# Patient Record
Sex: FEMALE | Race: BLACK OR AFRICAN AMERICAN | NOT HISPANIC OR LATINO | Employment: FULL TIME | URBAN - METROPOLITAN AREA
[De-identification: names, ages, dates, MRNs, and addresses within clinical notes are randomized per-mention and may not be internally consistent; named-entity substitution may affect disease eponyms.]

---

## 2023-05-19 ENCOUNTER — HOSPITAL ENCOUNTER (EMERGENCY)
Facility: HOSPITAL | Age: 43
Discharge: HOME | End: 2023-05-19
Attending: INTERNAL MEDICINE
Payer: MEDICAID

## 2023-05-19 VITALS
BODY MASS INDEX: 45.99 KG/M2 | TEMPERATURE: 99 F | SYSTOLIC BLOOD PRESSURE: 150 MMHG | HEIGHT: 67 IN | OXYGEN SATURATION: 99 % | HEART RATE: 77 BPM | DIASTOLIC BLOOD PRESSURE: 92 MMHG | WEIGHT: 293 LBS | RESPIRATION RATE: 18 BRPM

## 2023-05-19 DIAGNOSIS — J22 ACUTE LOWER RESPIRATORY TRACT INFECTION: Primary | ICD-10-CM

## 2023-05-19 DIAGNOSIS — F17.200 SMOKER: ICD-10-CM

## 2023-05-19 PROCEDURE — 63600175 PHARM REV CODE 636 W HCPCS: Performed by: INTERNAL MEDICINE

## 2023-05-19 PROCEDURE — 99284 EMERGENCY DEPT VISIT MOD MDM: CPT | Mod: 25

## 2023-05-19 PROCEDURE — 96372 THER/PROPH/DIAG INJ SC/IM: CPT | Performed by: INTERNAL MEDICINE

## 2023-05-19 RX ORDER — DEXAMETHASONE SODIUM PHOSPHATE 4 MG/ML
8 INJECTION, SOLUTION INTRA-ARTICULAR; INTRALESIONAL; INTRAMUSCULAR; INTRAVENOUS; SOFT TISSUE ONCE
Status: COMPLETED | OUTPATIENT
Start: 2023-05-19 | End: 2023-05-19

## 2023-05-19 RX ORDER — AZITHROMYCIN 250 MG/1
TABLET, FILM COATED ORAL
Qty: 6 TABLET | Refills: 0 | Status: SHIPPED | OUTPATIENT
Start: 2023-05-19 | End: 2023-08-03

## 2023-05-19 RX ORDER — PREDNISONE 20 MG/1
20 TABLET ORAL DAILY
Qty: 5 TABLET | Refills: 0 | Status: SHIPPED | OUTPATIENT
Start: 2023-05-19 | End: 2023-08-03

## 2023-05-19 RX ORDER — AZELASTINE 1 MG/ML
1 SPRAY, METERED NASAL 2 TIMES DAILY
Qty: 30 ML | Refills: 0 | Status: SHIPPED | OUTPATIENT
Start: 2023-05-19 | End: 2023-08-03

## 2023-05-19 RX ADMIN — DEXAMETHASONE SODIUM PHOSPHATE 8 MG: 4 INJECTION, SOLUTION INTRA-ARTICULAR; INTRALESIONAL; INTRAMUSCULAR; INTRAVENOUS; SOFT TISSUE at 03:05

## 2023-05-19 NOTE — ED PROVIDER NOTES
Source of History:  Patient, no limitations    Chief complaint:  Cough (Pt to er with cough x 1 week. Pt states that she was recenty dx with bronchitis. Covid/flu swabbed on the 15)      HPI:  Larisa Marcus is a 42 y.o. female presenting with Cough (Pt to er with cough x 1 week. Pt states that she was recenty dx with bronchitis. Covid/flu swabbed on the 15th)         Cough: Patient complains of dyspnea, productive cough, and voice changes .  Symptoms began 1 week ago.  The cough is with wheezing, with shortness of breath, worsening over time and is aggravated by  smoking and infxn  Associated symptoms include:change in voice, chills, and wheezing. Patient does have a history of smoking. Patient  has previous Chest X-ray.        Review of Systems   Constitutional symptoms:  Negative except as documented in HPI.   Skin symptoms:  Negative except as documented in HPI.   HEENT symptoms:  Negative except as documented in HPI.   Respiratory symptoms:  Negative except as documented in HPI.   Cardiovascular symptoms:  Negative except as documented in HPI.   Gastrointestinal symptoms:  Negative except as documented in HPI.    Genitourinary symptoms:  Negative except as documented in HPI.   Musculoskeletal symptoms:  Negative except as documented in HPI.   Neurologic symptoms:  Negative except as documented in HPI.   Psychiatric symptoms:  Negative except as documented in HPI.   Allergy/immunologic symptoms:  Negative except as documented in HPI.             Additional review of systems information: All other systems reviewed and otherwise negative.      Review of patient's allergies indicates:  No Known Allergies    PMH:  As per HPI and below:    History reviewed. No pertinent past medical history.     History reviewed. No pertinent family history.    Past Surgical History:   Procedure Laterality Date     SECTION      x1       Social History     Tobacco Use    Smoking status: Every Day     Packs/day: 0.50  "    Types: Cigarettes    Smokeless tobacco: Never   Substance Use Topics    Alcohol use: Not Currently       There is no problem list on file for this patient.       Physical Exam:    BP (!) 150/92   Pulse 77   Temp 98.9 °F (37.2 °C) (Oral)   Resp 18   Ht 5' 7" (1.702 m)   Wt (!) 186.4 kg (411 lb)   SpO2 99%   BMI 64.37 kg/m²     Nursing note and vital signs reviewed.    General:  Alert, no acute distress. obese  Skin: Normal for Ethnic Origin, No cyanosis  HEENT: Normocephalic and atraumatic, Vision unchanged, Pupils symmetric, No icterus , Nasal mucosa is pink and moist, mild pharyngeal erythema, +hoarse voice, +nasal congestion  Cardiovascular:  Regular rate and rhythm  Chest Wall: No deformity, equal chest rise  Respiratory:  mild wheeze right side only, respirations are non-labored.    Musculoskeletal:  No deformity, Normal perfusion to all extremities  Gastrointestinal:  Soft, Non distended  Neurological:  Alert and oriented, normal motor observed, normal speech observed.    Psychiatric:  Cooperative, appropriate mood & affect.        Labs that have been ordered have been independently reviewed and interpreted by myself.     Old Chart Reviewed.      Initial Impression/ Differential Dx:  Bronchitis, URI, allergies, irritants, pulmonary edema, GERD, laryngitis, tracheitis, asthma, sinusitis, pneumonia, viral, COPD, medication side effect      MDM:      Reviewed Nurses Note.    Reviewed Pertinent old records.    Orders Placed This Encounter    X-Ray Chest PA And Lateral    dexAMETHasone injection 8 mg    predniSONE (DELTASONE) 20 MG tablet    azithromycin (Z-FAUSTO) 250 MG tablet    azelastine (ASTELIN) 137 mcg (0.1 %) nasal spray                    Labs Reviewed - No data to display       X-Ray Chest PA And Lateral    (Results Pending)        No visits with results within 1 Day(s) from this visit.   Latest known visit with results is:   No results found for any previous visit.       Imaging Results         "      X-Ray Chest PA And Lateral (In process)                                                          Diagnostic Impression:    1. Acute lower respiratory tract infection    2. Smoker         ED Disposition Condition    Discharge Stable             Follow-up Information       [facility] - Emergency Dept.    Specialty: Emergency Medicine  Why: If symptoms worsen  Contact information:  [address]  [facility] [id number] [phone]                            ED Prescriptions       Medication Sig Dispense Start Date End Date Auth. Provider    predniSONE (DELTASONE) 20 MG tablet Take 1 tablet (20 mg total) by mouth once daily. 5 tablet 5/19/2023 -- Chris Lao DO    azithromycin (Z-FAUSTO) 250 MG tablet Take 2 tablets by mouth on day 1; Take 1 tablet by mouth on days 2-5 6 tablet 5/19/2023 -- Chris Lao DO    azelastine (ASTELIN) 137 mcg (0.1 %) nasal spray 1 spray (137 mcg total) by Nasal route 2 (two) times daily. 30 mL 5/19/2023 -- Chris Lao DO          Follow-up Information       Follow up With Specialties Details Why Contact Info    [facility] - Emergency Dept Emergency Medicine  If symptoms worsen [id number]  [facility] [id number] [phone]             Chris Lao DO  05/19/23 0357

## 2023-07-16 ENCOUNTER — HOSPITAL ENCOUNTER (EMERGENCY)
Facility: HOSPITAL | Age: 43
Discharge: HOME | End: 2023-07-16
Attending: EMERGENCY MEDICINE
Payer: MEDICAID

## 2023-07-16 VITALS
OXYGEN SATURATION: 100 % | BODY MASS INDEX: 45.99 KG/M2 | HEIGHT: 67 IN | WEIGHT: 293 LBS | DIASTOLIC BLOOD PRESSURE: 83 MMHG | RESPIRATION RATE: 20 BRPM | SYSTOLIC BLOOD PRESSURE: 122 MMHG | HEART RATE: 88 BPM

## 2023-07-16 DIAGNOSIS — R60.0 BILATERAL LOWER EXTREMITY EDEMA: Primary | ICD-10-CM

## 2023-07-16 LAB
ABS NEUT CALC (OHS): 3.69 X10(3)/MCL (ref 2.1–9.2)
ALBUMIN SERPL-MCNC: 3.4 G/DL (ref 3.5–5)
ALBUMIN/GLOB SERPL: 1 RATIO (ref 1.1–2)
ALP SERPL-CCNC: 88 UNIT/L (ref 40–150)
ALT SERPL-CCNC: 9 UNIT/L (ref 0–55)
ANISOCYTOSIS BLD QL SMEAR: ABNORMAL
AST SERPL-CCNC: 12 UNIT/L (ref 5–34)
BILIRUBIN DIRECT+TOT PNL SERPL-MCNC: 0.2 MG/DL
BNP BLD-MCNC: <10 PG/ML
BUN SERPL-MCNC: 11.6 MG/DL (ref 7–18.7)
CALCIUM SERPL-MCNC: 9.2 MG/DL (ref 8.4–10.2)
CHLORIDE SERPL-SCNC: 105 MMOL/L (ref 98–107)
CO2 SERPL-SCNC: 23 MMOL/L (ref 22–29)
CREAT SERPL-MCNC: 0.87 MG/DL (ref 0.55–1.02)
EOSINOPHIL NFR BLD MANUAL: 0.18 X10(3)/MCL (ref 0–0.9)
EOSINOPHIL NFR BLD MANUAL: 2 % (ref 0–8)
ERYTHROCYTE [DISTWIDTH] IN BLOOD BY AUTOMATED COUNT: 15.9 % (ref 11.5–17)
GFR SERPLBLD CREATININE-BSD FMLA CKD-EPI: >60 MLS/MIN/1.73/M2
GLOBULIN SER-MCNC: 3.3 GM/DL (ref 2.4–3.5)
GLUCOSE SERPL-MCNC: 100 MG/DL (ref 74–100)
HCT VFR BLD AUTO: 42.3 % (ref 37–47)
HGB BLD-MCNC: 13.3 G/DL (ref 12–16)
LYMPHOCYTES NFR BLD MANUAL: 4.41 X10(3)/MCL
LYMPHOCYTES NFR BLD MANUAL: 49 % (ref 13–40)
MCH RBC QN AUTO: 26.1 PG (ref 27–31)
MCHC RBC AUTO-ENTMCNC: 31.4 G/DL (ref 33–36)
MCV RBC AUTO: 82.9 FL (ref 80–94)
MONOCYTES NFR BLD MANUAL: 0.72 X10(3)/MCL (ref 0.1–1.3)
MONOCYTES NFR BLD MANUAL: 8 % (ref 2–11)
NEUTROPHILS NFR BLD MANUAL: 41 % (ref 47–80)
PLATELET # BLD AUTO: 319 X10(3)/MCL (ref 130–400)
PLATELET # BLD EST: ADEQUATE 10*3/UL
PMV BLD AUTO: 10.4 FL (ref 7.4–10.4)
POTASSIUM SERPL-SCNC: 3.8 MMOL/L (ref 3.5–5.1)
PROT SERPL-MCNC: 6.7 GM/DL (ref 6.4–8.3)
RBC # BLD AUTO: 5.1 X10(6)/MCL (ref 4.2–5.4)
RBC MORPH BLD: ABNORMAL
SODIUM SERPL-SCNC: 140 MMOL/L (ref 136–145)
TROPONIN I SERPL-MCNC: <0.01 NG/ML (ref 0–0.04)
TSH SERPL-ACNC: 2.06 UIU/ML (ref 0.35–4.94)
WBC # SPEC AUTO: 9 X10(3)/MCL (ref 4.5–11.5)

## 2023-07-16 PROCEDURE — 84443 ASSAY THYROID STIM HORMONE: CPT | Performed by: EMERGENCY MEDICINE

## 2023-07-16 PROCEDURE — 83880 ASSAY OF NATRIURETIC PEPTIDE: CPT | Performed by: EMERGENCY MEDICINE

## 2023-07-16 PROCEDURE — 84484 ASSAY OF TROPONIN QUANT: CPT | Performed by: EMERGENCY MEDICINE

## 2023-07-16 PROCEDURE — 85027 COMPLETE CBC AUTOMATED: CPT | Performed by: EMERGENCY MEDICINE

## 2023-07-16 PROCEDURE — 99283 EMERGENCY DEPT VISIT LOW MDM: CPT

## 2023-07-16 PROCEDURE — 80053 COMPREHEN METABOLIC PANEL: CPT | Performed by: EMERGENCY MEDICINE

## 2023-07-16 NOTE — Clinical Note
"Larisa Romichaelle Marcus was seen and treated in our emergency department on 7/16/2023.  She may return to work on 07/18/2023.       If you have any questions or concerns, please don't hesitate to call.      Skylar Frederick MD"

## 2023-07-16 NOTE — DISCHARGE INSTRUCTIONS
Elevate your legs, wear compression hose, low-salt diet, weight loss.  Follow-up requested with a primary care provider [facility].  The Lasix you were previously prescribed can help with edema but also can cause dehydration so use caution.

## 2023-07-16 NOTE — ED PROVIDER NOTES
Encounter Date: 2023       History     Chief Complaint   Patient presents with    Foot Pain    Foot Swelling     Pt to er with b/l foot pain/swelling. Pt denies injury/trauma      42-year-old female with a history of morbid obesity complains of swelling to both of her feet for the last 4-6 weeks.  She states she went to an emergency room on  in [place] and was prescribed a fluid pill but that is not helping.  She does work at [employer] so is on her feet a lot.  Her feet tend to swell during the day and resolves overnight.  She has no chest pain or shortness of breath.  She has no PCP and can not remember when she last had any blood work done.  She currently is not pregnant stating that she is had a bilateral tubal ligation.      Review of patient's allergies indicates:  No Known Allergies  History reviewed. No pertinent past medical history.  Past Surgical History:   Procedure Laterality Date     SECTION      x1     History reviewed. No pertinent family history.  Social History     Tobacco Use    Smoking status: Every Day     Packs/day: 0.50     Types: Cigarettes    Smokeless tobacco: Never   Substance Use Topics    Alcohol use: Not Currently     Review of Systems   Musculoskeletal:         Bilateral lower extremity swelling   All other systems reviewed and are negative.    Physical Exam     Initial Vitals [23 0059]   BP Pulse Resp Temp SpO2   122/83 88 20 -- 100 %      MAP       --         Physical Exam    Nursing note and vitals reviewed.  Constitutional: She appears well-developed and well-nourished. She is not diaphoretic. No distress.   HENT:   Head: Normocephalic and atraumatic.   Mouth/Throat: Oropharynx is clear and moist.   Eyes: Conjunctivae are normal. Pupils are equal, round, and reactive to light.   Neck: Neck supple. No JVD present.   Cardiovascular:  Normal rate, regular rhythm, normal heart sounds and intact distal pulses.           Pulmonary/Chest: Breath sounds  normal. No respiratory distress. She has no wheezes. She has no rhonchi. She has no rales.   Abdominal: Abdomen is soft. She exhibits no distension. There is no abdominal tenderness. There is no guarding.   Musculoskeletal:         General: Edema present. No tenderness. Normal range of motion.      Cervical back: Neck supple.      Comments: Pedal edema bilaterally and trace pitting pretibial edema.  Pedal pulses are 2+ with no sign of infection.  No calf tenderness and negative Homans sign.     Neurological: She is alert and oriented to person, place, and time.   Skin: Skin is warm and dry. Capillary refill takes less than 2 seconds. No rash noted.   Psychiatric: She has a normal mood and affect. Thought content normal.       ED Course   Procedures  Labs Reviewed   COMPREHENSIVE METABOLIC PANEL - Abnormal; Notable for the following components:       Result Value    Albumin Level 3.4 (*)     Albumin/Globulin Ratio 1.0 (*)     All other components within normal limits   CBC WITH DIFFERENTIAL - Abnormal; Notable for the following components:    MCH 26.1 (*)     MCHC 31.4 (*)     All other components within normal limits   MANUAL DIFFERENTIAL - Abnormal; Notable for the following components:    Neutrophils % 41 (*)     Lymphs % 49 (*)     RBC Morph Abnormal (*)     Anisocytosis 1+ (*)     All other components within normal limits   TROPONIN I - Normal   B-TYPE NATRIURETIC PEPTIDE - Normal   TSH - Normal   CBC W/ AUTO DIFFERENTIAL    Narrative:     The following orders were created for panel order CBC auto differential.  Procedure                               Abnormality         Status                     ---------                               -----------         ------                     CBC with Differential[[id number]]        Abnormal            Final result               Manual Differential[[id number]]          Abnormal            Final result                 Please view results for these tests on the individual  orders.          Imaging Results    None          Medications - No data to display  Medical Decision Making:   Initial Assessment:   42-year-old female with a history of morbid obesity complains of swelling to both of her feet for the last 4-6 weeks.  She states she went to an emergency room on July 4th in [place] and was prescribed a fluid pill but that is not helping.  She does work at [employer] so is on her feet a lot.  Her feet tend to swell during the day and resolves overnight.  She has no chest pain or shortness of breath.  She has no PCP and can not remember when she last had any blood work done.  She currently is not pregnant stating that she is had a bilateral tubal ligation.      Differential Diagnosis:   Differential diagnosis includes but is not limited to dependent edema, edema related to hormones status, edema related to salt intake, congestive heart failure, kidney failure, liver failure  Clinical Tests:   Lab Tests: Reviewed  ED Management:  Patient was seen and evaluated in the emergency department with history, physical exam, lab work.  She has bilateral pedal edema which I suspect is due to a combination of factors.  She stands on her feet all day and has morbid obesity.  No sign of congestive heart failure in her lab work is relatively benign.  I recommend she follow-up with a primary care provider, elevate her feet when she is not working, and eat a low-salt diet.           ED Course as of 07/17/23 0234   Sun Jul 16, 2023   0240 Thyroid Stimulating Hormone: 2.063 [SH]   0240 Troponin I: <0.010 [SH]   0241 BNP: <10.0 [SH]      ED Course User Index  [SH] Skylar Frederick MD                 Clinical Impression:   Final diagnoses:  [R60.0] Bilateral lower extremity edema (Primary)        ED Disposition Condition    Discharge Stable          ED Prescriptions    None       Follow-up Information       Follow up With Specialties Details Why Contact Info    PCP  Schedule an appointment as  soon as possible for a visit                Skylar Frederick MD  07/17/23 0236

## 2023-08-03 ENCOUNTER — OFFICE VISIT (OUTPATIENT)
Dept: INTERNAL MEDICINE | Facility: CLINIC | Age: 43
End: 2023-08-03
Payer: MEDICAID

## 2023-08-03 VITALS
RESPIRATION RATE: 18 BRPM | OXYGEN SATURATION: 97 % | SYSTOLIC BLOOD PRESSURE: 138 MMHG | HEIGHT: 67 IN | TEMPERATURE: 98 F | DIASTOLIC BLOOD PRESSURE: 80 MMHG | WEIGHT: 293 LBS | HEART RATE: 60 BPM | BODY MASS INDEX: 45.99 KG/M2

## 2023-08-03 DIAGNOSIS — R60.0 BILATERAL LOWER EXTREMITY EDEMA: ICD-10-CM

## 2023-08-03 PROCEDURE — 99214 OFFICE O/P EST MOD 30 MIN: CPT | Mod: PBBFAC | Performed by: STUDENT IN AN ORGANIZED HEALTH CARE EDUCATION/TRAINING PROGRAM

## 2023-08-03 RX ORDER — FUROSEMIDE 20 MG/1
20 TABLET ORAL DAILY PRN
Qty: 30 TABLET | Refills: 0 | Status: SHIPPED | OUTPATIENT
Start: 2023-08-03 | End: 2023-09-28 | Stop reason: SDUPTHER

## 2023-08-03 NOTE — PROGRESS NOTES
Subjective     Patient ID: Larisa Marcus is a 42 y.o. female.    Chief Complaint: No chief complaint on file.    Patient is a 42-year-old female with no significant past medical history who presents to [facility] internal medicine clinic to establish care.  Patient recently seen in emergency department for increased swelling of bilateral lower extremities.  Workup at that time negative for any acute issue, patient was discharged on oral Lasix.  Chest x-ray done at that time showed possible cardiomegaly.  BNP and troponin remained negative, most other labs within normal limits.  Patient states swelling has increased for the last few weeks, she notes it is worse at the end of the day and better in the morning after her legs have been elevated.  She works at [employer] in his on her feet most of the day, can usually tolerate this without issue.  Patient does state she gets some shortness of breath with increased exertion, no shortness of breath while sitting still and no episodes of chest pain.  Patient states she does not take any medicines on a daily basis.  She states she has never been diagnosed with diabetes, no hypertension, no history of high cholesterol.  Does not struggle with excessive daytime sleepiness but does state she snores loudly and has had previous witnessed apnea.  No prior sleep study has been done.  Patient remains somewhat anxious about new issues, otherwise has no acute concerns at this time      Review of Systems   Constitutional:  Negative for activity change and unexpected weight change.   HENT:  Negative for hearing loss, rhinorrhea and trouble swallowing.    Eyes:  Negative for discharge and visual disturbance.   Respiratory:  Negative for chest tightness and wheezing.    Cardiovascular:  Positive for leg swelling. Negative for chest pain and palpitations.   Gastrointestinal:  Negative for blood in stool, constipation, diarrhea and vomiting.   Endocrine: Negative for polydipsia and polyuria.    Genitourinary:  Negative for difficulty urinating, dysuria, hematuria and menstrual problem.   Musculoskeletal:  Negative for arthralgias, joint swelling and neck pain.   Neurological:  Negative for weakness and headaches.   Psychiatric/Behavioral:  Negative for confusion and dysphoric mood.         Objective     Physical Exam  Constitutional:       General: She is not in acute distress.     Appearance: Normal appearance. She is obese.   HENT:      Head: Normocephalic and atraumatic.   Cardiovascular:      Rate and Rhythm: Regular rhythm. Bradycardia present.      Heart sounds: Normal heart sounds.   Pulmonary:      Effort: Pulmonary effort is normal. No respiratory distress.      Breath sounds: Normal breath sounds.   Abdominal:      Palpations: Abdomen is soft.      Tenderness: There is no abdominal tenderness.   Musculoskeletal:         General: Normal range of motion.      Right lower leg: No edema.      Left lower leg: No edema.   Neurological:      General: No focal deficit present.      Mental Status: She is alert and oriented to person, place, and time.          Assessment and Plan     1. Bilateral lower extremity edema  -     Ambulatory referral/consult to Internal Medicine  -     Echo Saline Bubble? No; Future; Expected date: 08/17/2023    Other orders  -     furosemide (LASIX) 20 MG tablet; Take 1 tablet (20 mg total) by mouth daily as needed (swelling).  Dispense: 30 tablet; Refill: 0    Bilateral lower extremity edema   Dyspnea on exertion   -will order echocardiogram to be completed in next 1-2 weeks   -will follow-up on results with patient   -will prescribe Lasix 20 mg as needed for more significant swelling   -otherwise counseled on using compression stockings, elevating legs when able   -likely will eventually need sleep study performed, patient states she would like to wait on this for now    Will see patient in 2 months after echocardiogram complete   Additional labs and health maintenance at  that time    [facility]       Follow up in about 3 months (around 11/3/2023).

## 2023-08-07 ENCOUNTER — HOSPITAL ENCOUNTER (EMERGENCY)
Facility: HOSPITAL | Age: 43
Discharge: HOME | End: 2023-08-07
Attending: EMERGENCY MEDICINE
Payer: MEDICAID

## 2023-08-07 VITALS
HEART RATE: 76 BPM | TEMPERATURE: 97 F | OXYGEN SATURATION: 97 % | SYSTOLIC BLOOD PRESSURE: 136 MMHG | RESPIRATION RATE: 18 BRPM | DIASTOLIC BLOOD PRESSURE: 83 MMHG

## 2023-08-07 DIAGNOSIS — H65.90 FLUID LEVEL BEHIND TYMPANIC MEMBRANE, UNSPECIFIED LATERALITY: Primary | ICD-10-CM

## 2023-08-07 DIAGNOSIS — R42 DIZZINESS: ICD-10-CM

## 2023-08-07 DIAGNOSIS — R06.00 DYSPNEA: ICD-10-CM

## 2023-08-07 LAB
ALBUMIN SERPL-MCNC: 3.6 G/DL (ref 3.5–5)
ALBUMIN/GLOB SERPL: 1 RATIO (ref 1.1–2)
ALP SERPL-CCNC: 91 UNIT/L (ref 40–150)
ALT SERPL-CCNC: 12 UNIT/L (ref 0–55)
APPEARANCE UR: CLEAR
AST SERPL-CCNC: 15 UNIT/L (ref 5–34)
B-HCG SERPL QL: NEGATIVE
BASOPHILS # BLD AUTO: 0.05 X10(3)/MCL
BASOPHILS NFR BLD AUTO: 0.6 %
BILIRUB UR QL STRIP.AUTO: NEGATIVE
BILIRUBIN DIRECT+TOT PNL SERPL-MCNC: 0.3 MG/DL
BNP BLD-MCNC: <10 PG/ML
BUN SERPL-MCNC: 8.7 MG/DL (ref 7–18.7)
CALCIUM SERPL-MCNC: 9.6 MG/DL (ref 8.4–10.2)
CHLORIDE SERPL-SCNC: 104 MMOL/L (ref 98–107)
CO2 SERPL-SCNC: 27 MMOL/L (ref 22–29)
COLOR UR: NORMAL
CREAT SERPL-MCNC: 0.78 MG/DL (ref 0.55–1.02)
D DIMER PPP IA.FEU-MCNC: 0.44 UG/ML FEU (ref 0–0.5)
EOSINOPHIL # BLD AUTO: 0.49 X10(3)/MCL (ref 0–0.9)
EOSINOPHIL NFR BLD AUTO: 6.1 %
ERYTHROCYTE [DISTWIDTH] IN BLOOD BY AUTOMATED COUNT: 15.7 % (ref 11.5–17)
GFR SERPLBLD CREATININE-BSD FMLA CKD-EPI: >60 MLS/MIN/1.73/M2
GLOBULIN SER-MCNC: 3.7 GM/DL (ref 2.4–3.5)
GLUCOSE SERPL-MCNC: 107 MG/DL (ref 74–100)
GLUCOSE UR QL STRIP.AUTO: NEGATIVE
HCT VFR BLD AUTO: 42.6 % (ref 37–47)
HGB BLD-MCNC: 13.6 G/DL (ref 12–16)
IMM GRANULOCYTES # BLD AUTO: 0.02 X10(3)/MCL (ref 0–0.04)
IMM GRANULOCYTES NFR BLD AUTO: 0.2 %
KETONES UR QL STRIP.AUTO: NEGATIVE
LEUKOCYTE ESTERASE UR QL STRIP.AUTO: NEGATIVE
LYMPHOCYTES # BLD AUTO: 3.86 X10(3)/MCL (ref 0.6–4.6)
LYMPHOCYTES NFR BLD AUTO: 47.8 %
MCH RBC QN AUTO: 26.6 PG (ref 27–31)
MCHC RBC AUTO-ENTMCNC: 31.9 G/DL (ref 33–36)
MCV RBC AUTO: 83.4 FL (ref 80–94)
MONOCYTES # BLD AUTO: 0.53 X10(3)/MCL (ref 0.1–1.3)
MONOCYTES NFR BLD AUTO: 6.6 %
NEUTROPHILS # BLD AUTO: 3.12 X10(3)/MCL (ref 2.1–9.2)
NEUTROPHILS NFR BLD AUTO: 38.7 %
NITRITE UR QL STRIP.AUTO: NEGATIVE
NRBC BLD AUTO-RTO: 0 %
PH UR STRIP.AUTO: 6.5 [PH]
PLATELET # BLD AUTO: 333 X10(3)/MCL (ref 130–400)
PMV BLD AUTO: 10.1 FL (ref 7.4–10.4)
POTASSIUM SERPL-SCNC: 3.8 MMOL/L (ref 3.5–5.1)
PROT SERPL-MCNC: 7.3 GM/DL (ref 6.4–8.3)
PROT UR QL STRIP.AUTO: NEGATIVE
RBC # BLD AUTO: 5.11 X10(6)/MCL (ref 4.2–5.4)
RBC UR QL AUTO: NEGATIVE
SODIUM SERPL-SCNC: 140 MMOL/L (ref 136–145)
SP GR UR STRIP.AUTO: 1.02
TROPONIN I SERPL-MCNC: <0.01 NG/ML (ref 0–0.04)
UROBILINOGEN UR STRIP-ACNC: 0.2
WBC # SPEC AUTO: 8.07 X10(3)/MCL (ref 4.5–11.5)

## 2023-08-07 PROCEDURE — 93005 ELECTROCARDIOGRAM TRACING: CPT

## 2023-08-07 PROCEDURE — 25000003 PHARM REV CODE 250: Performed by: EMERGENCY MEDICINE

## 2023-08-07 PROCEDURE — 81003 URINALYSIS AUTO W/O SCOPE: CPT | Performed by: EMERGENCY MEDICINE

## 2023-08-07 PROCEDURE — 81025 URINE PREGNANCY TEST: CPT | Performed by: EMERGENCY MEDICINE

## 2023-08-07 PROCEDURE — 80053 COMPREHEN METABOLIC PANEL: CPT | Performed by: EMERGENCY MEDICINE

## 2023-08-07 PROCEDURE — 83880 ASSAY OF NATRIURETIC PEPTIDE: CPT | Performed by: EMERGENCY MEDICINE

## 2023-08-07 PROCEDURE — 99285 EMERGENCY DEPT VISIT HI MDM: CPT | Mod: 25

## 2023-08-07 PROCEDURE — 85379 FIBRIN DEGRADATION QUANT: CPT | Performed by: EMERGENCY MEDICINE

## 2023-08-07 PROCEDURE — 84484 ASSAY OF TROPONIN QUANT: CPT | Performed by: EMERGENCY MEDICINE

## 2023-08-07 PROCEDURE — 85025 COMPLETE CBC W/AUTO DIFF WBC: CPT | Performed by: EMERGENCY MEDICINE

## 2023-08-07 RX ORDER — MECLIZINE HYDROCHLORIDE 25 MG/1
50 TABLET ORAL
Status: COMPLETED | OUTPATIENT
Start: 2023-08-07 | End: 2023-08-07

## 2023-08-07 RX ORDER — MECLIZINE HYDROCHLORIDE 25 MG/1
25 TABLET ORAL 3 TIMES DAILY PRN
Qty: 20 TABLET | Refills: 0 | OUTPATIENT
Start: 2023-08-07 | End: 2023-09-10

## 2023-08-07 RX ADMIN — MECLIZINE HYDROCHLORIDE 50 MG: 25 TABLET ORAL at 12:08

## 2023-08-07 NOTE — ED TRIAGE NOTES
Pt to er c/o dizziness onset one week ago with sob onset this morning. Pt c/o feeling light headed and is very anxious.

## 2023-08-07 NOTE — ED PROVIDER NOTES
Encounter Date: 2023       History     Chief Complaint   Patient presents with    Dizziness     Pt to er c/o dizziness onset one week ago with sob onset this morning. Pt c/o feeling light headed and is very anxious.     Patient is a42 yo F presneting with an episode of dizziness. Started when alking today. Worse with moving head, describes as room spinning. Still having it when she moves her head. Never had this happen befofre. No chest pain associated with it. No recent illness. No  ear complaints.         Review of patient's allergies indicates:  No Known Allergies  No past medical history on file.  Past Surgical History:   Procedure Laterality Date     SECTION      x1     Family History   Problem Relation Age of Onset    Diabetes Mother     No Known Problems Father     Diabetes Sister     Diabetes Brother      Social History     Tobacco Use    Smoking status: Every Day     Current packs/day: 0.00     Types: Cigarettes     Start date:     Smokeless tobacco: Never   Substance Use Topics    Alcohol use: Not Currently    Drug use: Never     Review of Systems    Physical Exam     Initial Vitals [23 1015]   BP Pulse Resp Temp SpO2   126/78 79 (!) 26 96.8 °F (36 °C) 98 %      MAP       --         Physical Exam    Nursing note and vitals reviewed.  Constitutional: She appears well-developed and well-nourished. No distress.   HENT:   Head: Normocephalic and atraumatic.   Fluid in the R middl eear   Eyes: Conjunctivae are normal. Pupils are equal, round, and reactive to light.   Neck: Neck supple.   Cardiovascular:  Normal rate, regular rhythm and normal heart sounds.           No murmur heard.  Pulmonary/Chest: Breath sounds normal. No respiratory distress. She has no wheezes. She has no rhonchi.   Abdominal: Abdomen is soft. Bowel sounds are normal. She exhibits no distension. There is no abdominal tenderness. There is no rebound and no guarding.   Musculoskeletal:         General: No  edema. Normal range of motion.      Cervical back: Neck supple.     Neurological: She is alert and oriented to person, place, and time.   Skin: Skin is warm and dry.   Psychiatric: She has a normal mood and affect. Thought content normal.         ED Course   Procedures  Labs Reviewed - No data to display  EKG Readings: (Independently Interpreted)   EKG Interpretation 10:15 AM    Rate: 76  Rhythm: NSR  QRS: WNL  Qtc: WNL  Sinus arrhythmia, non specific T wave abnormality  No olds for comparision         Imaging Results    None          Medications - No data to display              ED Course as of 08/07/23 1336   Mon Aug 07, 2023   1335 Patient feel  better. Not as dizzy. Able to ambulate without difficulty [GM]      ED Course User Index  [GM] Rebekah Moeller MD                 Clinical Impression:   Final diagnoses:  [R42] Dizziness  [R06.00] Dyspnea               Rebekah Moeller MD  08/07/23 1337

## 2023-08-09 NOTE — PROGRESS NOTES
I have reviewed and concur with the resident's history, physical, assessment, and plan.  I have discussed with him all issues related to the diagnosis, workup and treatment plan. Care provided as reasonable and necessary.    Zhang Hernandez MD  [facility]

## 2023-08-24 ENCOUNTER — HOSPITAL ENCOUNTER (OUTPATIENT)
Dept: CARDIOLOGY | Facility: HOSPITAL | Age: 43
Discharge: HOME | End: 2023-08-24
Attending: STUDENT IN AN ORGANIZED HEALTH CARE EDUCATION/TRAINING PROGRAM
Payer: MEDICAID

## 2023-08-24 DIAGNOSIS — R60.0 BILATERAL LOWER EXTREMITY EDEMA: ICD-10-CM

## 2023-08-24 LAB
AV INDEX (PROSTH): 0.62
AV MEAN GRADIENT: 5 MMHG
AV PEAK GRADIENT: 9 MMHG
AV VALVE AREA BY VELOCITY RATIO: 1.93 CM²
AV VALVE AREA: 2 CM²
AV VELOCITY RATIO: 0.6
CV ECHO LV RWT: 0.35 CM
DOP CALC AO PEAK VEL: 1.46 M/S
DOP CALC AO VTI: 33 CM
DOP CALC LVOT AREA: 3.2 CM2
DOP CALC LVOT DIAMETER: 2.02 CM
DOP CALC LVOT PEAK VEL: 0.88 M/S
DOP CALC LVOT STROKE VOLUME: 65.98 CM3
DOP CALC MV VTI: 34 CM
DOP CALCLVOT PEAK VEL VTI: 20.6 CM
E WAVE DECELERATION TIME: 245.39 MSEC
E/A RATIO: 1.25
ECHO LV POSTERIOR WALL: 0.98 CM (ref 0.6–1.1)
FRACTIONAL SHORTENING: 26 % (ref 28–44)
HR MV ECHO: 60 BPM
INTERVENTRICULAR SEPTUM: 1.02 CM (ref 0.6–1.1)
IVC DIAMETER: 2 CM
LEFT ATRIUM SIZE: 3.86 CM
LEFT ATRIUM VOLUME MOD: 31.12 CM3
LEFT INTERNAL DIMENSION IN SYSTOLE: 4.11 CM (ref 2.1–4)
LEFT VENTRICLE DIASTOLIC VOLUME: 152.97 ML
LEFT VENTRICLE SYSTOLIC VOLUME: 74.67 ML
LEFT VENTRICULAR INTERNAL DIMENSION IN DIASTOLE: 5.59 CM (ref 3.5–6)
LEFT VENTRICULAR MASS: 219.06 G
LV LATERAL E/E' RATIO: 5 M/S
LVOT MG: 1.61 MMHG
LVOT MV: 0.59 CM/S
MV MEAN GRADIENT: 1 MMHG
MV PEAK A VEL: 0.64 M/S
MV PEAK E VEL: 0.8 M/S
MV PEAK GRADIENT: 4 MMHG
MV VALVE AREA BY CONTINUITY EQUATION: 1.94 CM2
OHS LV EJECTION FRACTION SIMPSONS BIPLANE MOD: 49 %
RA MAJOR: 4.55 CM
TDI LATERAL: 0.16 M/S
TRICUSPID ANNULAR PLANE SYSTOLIC EXCURSION: 2.38 CM

## 2023-08-24 PROCEDURE — C8929 TTE W OR WO FOL WCON,DOPPLER: HCPCS

## 2023-08-24 PROCEDURE — 25500020 PHARM REV CODE 255: Performed by: INTERNAL MEDICINE

## 2023-08-24 RX ADMIN — HUMAN ALBUMIN MICROSPHERES AND PERFLUTREN 0.66 MG: 10; .22 INJECTION, SOLUTION INTRAVENOUS at 10:08

## 2023-09-10 ENCOUNTER — HOSPITAL ENCOUNTER (EMERGENCY)
Facility: HOSPITAL | Age: 43
Discharge: HOME | End: 2023-09-10
Attending: EMERGENCY MEDICINE
Payer: MEDICAID

## 2023-09-10 VITALS
HEART RATE: 92 BPM | RESPIRATION RATE: 18 BRPM | OXYGEN SATURATION: 97 % | TEMPERATURE: 98 F | HEIGHT: 67 IN | BODY MASS INDEX: 45.99 KG/M2 | DIASTOLIC BLOOD PRESSURE: 80 MMHG | WEIGHT: 293 LBS | SYSTOLIC BLOOD PRESSURE: 121 MMHG

## 2023-09-10 DIAGNOSIS — R42 VERTIGO: Primary | ICD-10-CM

## 2023-09-10 PROCEDURE — 99283 EMERGENCY DEPT VISIT LOW MDM: CPT

## 2023-09-10 PROCEDURE — 25000003 PHARM REV CODE 250: Performed by: EMERGENCY MEDICINE

## 2023-09-10 RX ORDER — MECLIZINE HYDROCHLORIDE 25 MG/1
25 TABLET ORAL
Status: COMPLETED | OUTPATIENT
Start: 2023-09-10 | End: 2023-09-10

## 2023-09-10 RX ORDER — MECLIZINE HYDROCHLORIDE 25 MG/1
25 TABLET ORAL 3 TIMES DAILY PRN
Qty: 30 TABLET | Refills: 0 | Status: SHIPPED | OUTPATIENT
Start: 2023-09-10 | End: 2023-09-28 | Stop reason: SDUPTHER

## 2023-09-10 RX ADMIN — MECLIZINE HYDROCHLORIDE 25 MG: 25 TABLET ORAL at 04:09

## 2023-09-10 NOTE — Clinical Note
"Larisa Romichaelle Marcus was seen and treated in our emergency department on 9/10/2023.  She may return to work on 09/11/2023.       If you have any questions or concerns, please don't hesitate to call.      Skylar Frederick MD"

## 2023-09-10 NOTE — ED PROVIDER NOTES
"Encounter Date: 9/10/2023       History     Chief Complaint   Patient presents with    Dizziness    Panic Attack     Pt to er with dizziness/panic attacks. Pt states that she was recently dx with vertigo      43-year-old female complains of feeling dizzy which then triggered a panic attack.  She states that she was at work tonight at [employer] when she started feeling a little lightheaded and like the room was spinning but then but the dizziness went away and it felt like it triggered an anxiety attack.  She felt like her heart was racing and she just "could not handle the situation" and "could not maintain" so she came to the emergency room.  She was seen here last month for the same symptoms and prescribed meclizine which she states she is out of.  She says the meclizine works really well.  She is not yet followed up with her PCP for these issues.  She has no ear pain, sore throat, nausea, vomiting.  She has no weakness or numbness and no difficulty swallowing.  She has no speech or visual difficulty.        Review of patient's allergies indicates:  No Known Allergies  Past Medical History:   Diagnosis Date    Benign paroxysmal vertigo, bilateral      Past Surgical History:   Procedure Laterality Date     SECTION      x1     Family History   Problem Relation Age of Onset    Diabetes Mother     No Known Problems Father     Diabetes Sister     Diabetes Brother      Social History     Tobacco Use    Smoking status: Every Day     Types: Cigarettes     Start date:     Smokeless tobacco: Never   Substance Use Topics    Alcohol use: Not Currently    Drug use: Never     Review of Systems   Neurological:  Positive for dizziness.   Psychiatric/Behavioral:  The patient is nervous/anxious.    All other systems reviewed and are negative.      Physical Exam     Initial Vitals [09/10/23 0343]   BP Pulse Resp Temp SpO2   121/80 92 18 97.8 °F (36.6 °C) 97 %      MAP       --         Physical Exam    Nursing note and " "vitals reviewed.  Constitutional: She appears well-developed and well-nourished. She is not diaphoretic. No distress.   HENT:   Head: Normocephalic and atraumatic.   Mouth/Throat: Oropharynx is clear and moist.   Eyes: Conjunctivae are normal. Pupils are equal, round, and reactive to light.   Neck: Neck supple.   Cardiovascular:  Normal rate, regular rhythm, normal heart sounds and intact distal pulses.           Pulmonary/Chest: Breath sounds normal. No respiratory distress. She has no wheezes. She has no rhonchi. She has no rales.   Abdominal: Abdomen is soft. She exhibits no distension. There is no abdominal tenderness. There is no guarding.   Musculoskeletal:         General: No tenderness or edema. Normal range of motion.      Cervical back: Neck supple.     Neurological: She is alert and oriented to person, place, and time.   Skin: Skin is warm and dry. Capillary refill takes less than 2 seconds. No rash noted.   Psychiatric: She has a normal mood and affect. Thought content normal.         ED Course   Procedures  Labs Reviewed - No data to display  EKG Readings: (Independently Interpreted)   Initial Reading: No STEMI. Rhythm: Normal Sinus Rhythm. Heart Rate: 86. Ectopy: No Ectopy. ST Segments: Normal ST Segments. T Waves: Normal. Clinical Impression: Normal Sinus Rhythm Other Impression: Nonspecific changes similar to previous EKGs       Imaging Results    None          Medications   meclizine tablet 25 mg (25 mg Oral Given 9/10/23 5219)     Medical Decision Making  43-year-old female complains of feeling dizzy which then triggered a panic attack.  She states that she was at work tonight at [employer] when she started feeling a little lightheaded and like the room was spinning but then but the dizziness went away and it felt like it triggered an anxiety attack.  She felt like her heart was racing and she just "could not handle the situation" and "could not maintain" so she came to the emergency room.  She " was seen here last month for the same symptoms and prescribed meclizine which she states she is out of.  She says the meclizine works really well.  She is not yet followed up with her PCP for these issues.  She has no ear pain, sore throat, nausea, vomiting.  She has no weakness or numbness and no difficulty swallowing.  She has no speech or visual difficulty.          Risk  Prescription drug management.  Risk Details: Patient was seen and evaluated in the Emergency Department with history, physical exam, EKG.  She was given a dose of meclizine in the emergency room and a prescription for meclizine because she states this works really well for her.  There is no sign of stroke or other serious pathology.  She will follow-up with her primary care provider for further care.  ER return precautions were discussed.                               Clinical Impression:   Final diagnoses:  [R42] Vertigo (Primary)        ED Disposition Condition    Discharge Stable          ED Prescriptions       Medication Sig Dispense Start Date End Date Auth. Provider    meclizine (ANTIVERT) 25 mg tablet Take 1 tablet (25 mg total) by mouth 3 (three) times daily as needed for Dizziness. 30 tablet 9/10/2023 -- Skylar Frederick MD          Follow-up Information       Follow up With Specialties Details Why Contact Info    PCP  Schedule an appointment as soon as possible for a visit                Skylar Frederick MD  09/10/23 5085

## 2023-09-28 ENCOUNTER — OFFICE VISIT (OUTPATIENT)
Dept: INTERNAL MEDICINE | Facility: CLINIC | Age: 43
End: 2023-09-28
Payer: MEDICAID

## 2023-09-28 VITALS
TEMPERATURE: 98 F | DIASTOLIC BLOOD PRESSURE: 69 MMHG | SYSTOLIC BLOOD PRESSURE: 109 MMHG | OXYGEN SATURATION: 98 % | WEIGHT: 293 LBS | BODY MASS INDEX: 45.99 KG/M2 | HEIGHT: 67 IN | HEART RATE: 79 BPM | RESPIRATION RATE: 16 BRPM

## 2023-09-28 DIAGNOSIS — M79.89 LOCALIZED SWELLING OF BOTH LOWER EXTREMITIES: ICD-10-CM

## 2023-09-28 DIAGNOSIS — H81.10 BENIGN PAROXYSMAL POSITIONAL VERTIGO, UNSPECIFIED LATERALITY: Primary | ICD-10-CM

## 2023-09-28 DIAGNOSIS — E66.01 CLASS 3 OBESITY: ICD-10-CM

## 2023-09-28 DIAGNOSIS — F41.1 GENERALIZED ANXIETY DISORDER: ICD-10-CM

## 2023-09-28 PROCEDURE — 99215 OFFICE O/P EST HI 40 MIN: CPT | Mod: PBBFAC | Performed by: STUDENT IN AN ORGANIZED HEALTH CARE EDUCATION/TRAINING PROGRAM

## 2023-09-28 RX ORDER — MECLIZINE HYDROCHLORIDE 25 MG/1
25 TABLET ORAL 3 TIMES DAILY PRN
Qty: 30 TABLET | Refills: 0 | OUTPATIENT
Start: 2023-09-28 | End: 2023-10-09

## 2023-09-28 RX ORDER — FUROSEMIDE 20 MG/1
20 TABLET ORAL DAILY PRN
Qty: 30 TABLET | Refills: 0 | Status: SHIPPED | OUTPATIENT
Start: 2023-09-28 | End: 2024-09-27

## 2023-09-28 RX ORDER — HYDROXYZINE PAMOATE 25 MG/1
25 CAPSULE ORAL
COMMUNITY
Start: 2023-09-18

## 2023-09-28 NOTE — PROGRESS NOTES
Subjective     Patient ID: Larisa Marcus is a 43 y.o. female.    Chief Complaint: Dizziness (Vertigo comes and go , feels like she  will pass out , bad anxiety attacks )    Patient is a 42-year-old female with past medical history of lower extremity swelling and vertigo who presents to [facility] for scheduled 2 month follow-up.  Patient states swelling has gotten much better since previous visit, she elevates her legs when able and takes furosemide as needed.  No longer having consistent issues with swelling.  Echocardiogram performed showed ejection fraction of 55%.  Patient now has new issue of vertigo, states she is struggled with this for approximately the last 2 months.  She states it will occur every day, sometimes 2-3 times a day.  Patient has been to emergency department multiple times and has been prescribed meclizine, she states this helps but she has run out.  Patient states she also has anxiety associated with episodes of vertigo.  Also occasionally will have pains that shoot down her neck.  Patient otherwise doing okay, increasing activity as tolerated.  Doing okay on medications, no acute concerns at this time besides vertigo    Review of Systems   Constitutional:  Negative for activity change and fever.   HENT:  Negative for rhinorrhea and trouble swallowing.    Respiratory:  Negative for chest tightness and wheezing.    Gastrointestinal:  Negative for abdominal pain.   Genitourinary:  Negative for dysuria.   Musculoskeletal:  Negative for arthralgias.   Neurological:  Positive for dizziness, vertigo and headaches.   Psychiatric/Behavioral:  The patient is nervous/anxious.         Objective     Physical Exam  Constitutional:       General: She is not in acute distress.     Appearance: Normal appearance. She is obese.   HENT:      Head: Normocephalic and atraumatic.   Cardiovascular:      Rate and Rhythm: Regular rhythm.      Heart sounds: Normal heart sounds.   Pulmonary:       Effort: Pulmonary effort is normal. No respiratory distress.      Breath sounds: Normal breath sounds.   Abdominal:      Palpations: Abdomen is soft.      Tenderness: There is no abdominal tenderness.   Musculoskeletal:         General: Normal range of motion.   Neurological:      General: No focal deficit present.      Mental Status: She is alert and oriented to person, place, and time.      Comments: HINTS negative        Assessment and Plan     1. Benign paroxysmal positional vertigo, unspecified laterality  -     Cancel: Ambulatory referral/consult to Physical/Occupational Therapy; Future; Expected date: 10/05/2023  -     Ambulatory referral/consult to Physical/Occupational Therapy; Future; Expected date: 10/05/2023    2. Localized swelling of both lower extremities    3. Generalized anxiety disorder    4. Class 3 obesity    Other orders  -     meclizine (ANTIVERT) 25 mg tablet; Take 1 tablet (25 mg total) by mouth 3 (three) times daily as needed for Dizziness.  Dispense: 30 tablet; Refill: 0  -     furosemide (LASIX) 20 MG tablet; Take 1 tablet (20 mg total) by mouth daily as needed (swelling).  Dispense: 30 tablet; Refill: 0    Benign paroxysmal positional vertigo   -patient states she gets symptoms daily   -can continue using meclizine as needed, will refill today   -gave patient instructions on repositioning techniques   -will refer patient to [facility] physical therapy for vestibular therapy    Bilateral lower extremity edema   Dyspnea on exertion   -echocardiogram showed good heart function   -can continue using Lasix as needed if elevation of legs and compression does not adequately work  -will continue to monitor symptoms    Will see patient in acute care slot next month to see if symptoms have improved  Other healthcare maintenance to be done at that time    [facility]       Follow up in about 1 month (around 10/28/2023) for acute care slot on Monday afternoon.

## 2023-10-09 ENCOUNTER — HOSPITAL ENCOUNTER (EMERGENCY)
Facility: HOSPITAL | Age: 43
Discharge: HOME | End: 2023-10-09
Attending: STUDENT IN AN ORGANIZED HEALTH CARE EDUCATION/TRAINING PROGRAM
Payer: MEDICAID

## 2023-10-09 VITALS
OXYGEN SATURATION: 100 % | SYSTOLIC BLOOD PRESSURE: 131 MMHG | HEART RATE: 73 BPM | BODY MASS INDEX: 45.99 KG/M2 | HEIGHT: 67 IN | TEMPERATURE: 98 F | WEIGHT: 293 LBS | RESPIRATION RATE: 20 BRPM | DIASTOLIC BLOOD PRESSURE: 61 MMHG

## 2023-10-09 DIAGNOSIS — H81.10 BPPV (BENIGN PAROXYSMAL POSITIONAL VERTIGO), UNSPECIFIED LATERALITY: Primary | ICD-10-CM

## 2023-10-09 LAB
ALBUMIN SERPL-MCNC: 3.3 G/DL (ref 3.5–5)
ALBUMIN/GLOB SERPL: 0.9 RATIO (ref 1.1–2)
ALP SERPL-CCNC: 81 UNIT/L (ref 40–150)
ALT SERPL-CCNC: 8 UNIT/L (ref 0–55)
APPEARANCE UR: CLEAR
AST SERPL-CCNC: 13 UNIT/L (ref 5–34)
B-HCG SERPL QL: NEGATIVE
BASOPHILS # BLD AUTO: 0.06 X10(3)/MCL
BASOPHILS NFR BLD AUTO: 0.7 %
BILIRUB SERPL-MCNC: 0.2 MG/DL
BILIRUB UR QL STRIP.AUTO: NEGATIVE
BUN SERPL-MCNC: 11.1 MG/DL (ref 7–18.7)
CALCIUM SERPL-MCNC: 9.5 MG/DL (ref 8.4–10.2)
CHLORIDE SERPL-SCNC: 106 MMOL/L (ref 98–107)
CO2 SERPL-SCNC: 24 MMOL/L (ref 22–29)
COLOR UR AUTO: NORMAL
CREAT SERPL-MCNC: 0.7 MG/DL (ref 0.55–1.02)
EOSINOPHIL # BLD AUTO: 0.48 X10(3)/MCL (ref 0–0.9)
EOSINOPHIL NFR BLD AUTO: 5.6 %
ERYTHROCYTE [DISTWIDTH] IN BLOOD BY AUTOMATED COUNT: 14.1 % (ref 11.5–17)
GFR SERPLBLD CREATININE-BSD FMLA CKD-EPI: >60 MLS/MIN/1.73/M2
GLOBULIN SER-MCNC: 3.6 GM/DL (ref 2.4–3.5)
GLUCOSE SERPL-MCNC: 110 MG/DL (ref 74–100)
GLUCOSE UR QL STRIP.AUTO: NEGATIVE
HCT VFR BLD AUTO: 41.1 % (ref 37–47)
HGB BLD-MCNC: 13.1 G/DL (ref 12–16)
IMM GRANULOCYTES # BLD AUTO: 0.02 X10(3)/MCL (ref 0–0.04)
IMM GRANULOCYTES NFR BLD AUTO: 0.2 %
KETONES UR QL STRIP.AUTO: NEGATIVE
LEUKOCYTE ESTERASE UR QL STRIP.AUTO: NEGATIVE
LYMPHOCYTES # BLD AUTO: 4.03 X10(3)/MCL (ref 0.6–4.6)
LYMPHOCYTES NFR BLD AUTO: 47.4 %
MCH RBC QN AUTO: 26.6 PG (ref 27–31)
MCHC RBC AUTO-ENTMCNC: 31.9 G/DL (ref 33–36)
MCV RBC AUTO: 83.4 FL (ref 80–94)
MONOCYTES # BLD AUTO: 0.51 X10(3)/MCL (ref 0.1–1.3)
MONOCYTES NFR BLD AUTO: 6 %
NEUTROPHILS # BLD AUTO: 3.4 X10(3)/MCL (ref 2.1–9.2)
NEUTROPHILS NFR BLD AUTO: 40.1 %
NITRITE UR QL STRIP.AUTO: NEGATIVE
NRBC BLD AUTO-RTO: 0 %
PH UR STRIP.AUTO: 7.5 [PH]
PLATELET # BLD AUTO: 357 X10(3)/MCL (ref 130–400)
PMV BLD AUTO: 10 FL (ref 7.4–10.4)
POTASSIUM SERPL-SCNC: 3.9 MMOL/L (ref 3.5–5.1)
PROT SERPL-MCNC: 6.9 GM/DL (ref 6.4–8.3)
PROT UR QL STRIP.AUTO: NEGATIVE
RBC # BLD AUTO: 4.93 X10(6)/MCL (ref 4.2–5.4)
RBC UR QL AUTO: NEGATIVE
SODIUM SERPL-SCNC: 140 MMOL/L (ref 136–145)
SP GR UR STRIP.AUTO: 1.01 (ref 1–1.03)
UROBILINOGEN UR STRIP-ACNC: 1
WBC # SPEC AUTO: 8.5 X10(3)/MCL (ref 4.5–11.5)

## 2023-10-09 PROCEDURE — 25000003 PHARM REV CODE 250: Performed by: STUDENT IN AN ORGANIZED HEALTH CARE EDUCATION/TRAINING PROGRAM

## 2023-10-09 PROCEDURE — 81003 URINALYSIS AUTO W/O SCOPE: CPT | Performed by: STUDENT IN AN ORGANIZED HEALTH CARE EDUCATION/TRAINING PROGRAM

## 2023-10-09 PROCEDURE — 81025 URINE PREGNANCY TEST: CPT | Performed by: STUDENT IN AN ORGANIZED HEALTH CARE EDUCATION/TRAINING PROGRAM

## 2023-10-09 PROCEDURE — 85025 COMPLETE CBC W/AUTO DIFF WBC: CPT | Performed by: STUDENT IN AN ORGANIZED HEALTH CARE EDUCATION/TRAINING PROGRAM

## 2023-10-09 PROCEDURE — 80053 COMPREHEN METABOLIC PANEL: CPT | Performed by: STUDENT IN AN ORGANIZED HEALTH CARE EDUCATION/TRAINING PROGRAM

## 2023-10-09 PROCEDURE — 99283 EMERGENCY DEPT VISIT LOW MDM: CPT

## 2023-10-09 RX ORDER — MECLIZINE HYDROCHLORIDE 25 MG/1
50 TABLET ORAL 2 TIMES DAILY PRN
Qty: 60 TABLET | Refills: 0 | Status: SHIPPED | OUTPATIENT
Start: 2023-10-09 | End: 2023-11-08 | Stop reason: SDUPTHER

## 2023-10-09 RX ORDER — MECLIZINE HYDROCHLORIDE 25 MG/1
50 TABLET ORAL
Status: COMPLETED | OUTPATIENT
Start: 2023-10-09 | End: 2023-10-09

## 2023-10-09 RX ADMIN — MECLIZINE HYDROCHLORIDE 50 MG: 25 TABLET ORAL at 04:10

## 2023-10-09 NOTE — ED PROVIDER NOTES
Encounter Date: 10/9/2023       History     Chief Complaint   Patient presents with    Dizziness     Pt to er with dizziness. Pt has hx of vertigo      HPI    43-year-old female with a past medical history of recurrent BPPV presents emergency department for dizziness.  States she ran out of her meclizine.  She states he has an appointment upcoming with a physical therapist for vestibular rehab.  Has never seen ENT.  Has multiple CT scans and ER visits in the past with the same thing.  Spoke with on-call nurse on Friday who told her to take to 25 mg meclizine which did help but then she ran out the same day.  States she has been out of her medicine.  States she out of meclizine she gets dizzy.  No chest pain shortness of breath.  Denies any other symptoms.    Review of patient's allergies indicates:  No Known Allergies  Past Medical History:   Diagnosis Date    Benign paroxysmal vertigo, bilateral      Past Surgical History:   Procedure Laterality Date     SECTION      x1     Family History   Problem Relation Age of Onset    Diabetes Mother     No Known Problems Father     Diabetes Sister     Diabetes Brother      Social History     Tobacco Use    Smoking status: Every Day     Types: Cigarettes     Start date:     Smokeless tobacco: Never   Substance Use Topics    Alcohol use: Not Currently    Drug use: Never     Review of Systems   Constitutional:  Negative for fever.   HENT:  Negative for sore throat.    Respiratory:  Negative for cough and shortness of breath.    Cardiovascular:  Negative for chest pain.   Gastrointestinal:  Negative for abdominal pain, constipation, diarrhea, nausea and vomiting.   Genitourinary:  Negative for dysuria.   Musculoskeletal:  Negative for back pain.   Skin:  Negative for rash.   Neurological:  Positive for dizziness. Negative for facial asymmetry, speech difficulty, weakness, light-headedness, numbness and headaches.   Hematological:  Does not bruise/bleed easily.   All  other systems reviewed and are negative.      Physical Exam     Initial Vitals [10/09/23 0426]   BP Pulse Resp Temp SpO2   131/61 73 20 98.1 °F (36.7 °C) 100 %      MAP       --         Physical Exam    Nursing note and vitals reviewed.  Constitutional: She appears well-developed and well-nourished. No distress.   Eyes: EOM are normal. Pupils are equal, round, and reactive to light.   Cardiovascular:  Normal rate and regular rhythm.           Pulmonary/Chest: Breath sounds normal. No respiratory distress. She has no wheezes. She has no rhonchi. She has no rales.   Abdominal: Abdomen is soft. There is no abdominal tenderness. There is no rebound and no guarding.   Musculoskeletal:         General: No tenderness. Normal range of motion.     Neurological: She is alert and oriented to person, place, and time. She has normal strength.   Rightward beating nystagmus.  Negative hints exam   Skin: Skin is warm. Capillary refill takes less than 2 seconds.         ED Course   Procedures  Labs Reviewed   COMPREHENSIVE METABOLIC PANEL - Abnormal; Notable for the following components:       Result Value    Glucose Level 110 (*)     Albumin Level 3.3 (*)     Globulin 3.6 (*)     Albumin/Globulin Ratio 0.9 (*)     All other components within normal limits   CBC WITH DIFFERENTIAL - Abnormal; Notable for the following components:    MCH 26.6 (*)     MCHC 31.9 (*)     All other components within normal limits   PREGNANCY TEST, URINE RAPID - Normal   URINALYSIS, REFLEX TO URINE CULTURE - Normal   CBC W/ AUTO DIFFERENTIAL    Narrative:     The following orders were created for panel order CBC auto differential.  Procedure                               Abnormality         Status                     ---------                               -----------         ------                     CBC with Differential[[id number]]       Abnormal            Final result                 Please view results for these tests on the individual orders.           Imaging Results    None          Medications   meclizine tablet 50 mg (50 mg Oral Given 10/9/23 0444)     Medical Decision Making  differential diagnosis  Peripheral vertigo, central vertigo, metabolic derangement, anemia,  as well as multiple other possible etiologies    Patient has been worked up for this multiple times in the past.  Will get basic blood work.  No indication for CT.  Will refer to ENT.  Patient has an appointment upcoming for vestibular rehab.    Problems Addressed:  BPPV (benign paroxysmal positional vertigo), unspecified laterality: chronic illness or injury    Amount and/or Complexity of Data Reviewed  Labs: ordered. Decision-making details documented in ED Course.               ED Course as of 10/09/23 0519   Mon Oct 09, 2023   0502 WBC: 8.50 [BS]   0502 Hemoglobin: 13.1 [BS]   0502 Hematocrit: 41.1 [BS]   0502 Leukocytes, UA: Negative [BS]   0502 NITRITE UA: Negative [BS]   0518 Sodium: 140 [BS]   0519 Potassium: 3.9 [BS]   0519 Chloride: 106 [BS]   0519 CO2: 24 [BS]   0519 Glucose(!): 110 [BS]   0519 BUN: 11.1 [BS]   0519 Creatinine: 0.70 [BS]   0519 Labs are normal.  Will discharge.  Patient feels better.  ENT referral sent. [BS]      ED Course User Index  [BS] Antonio Babb MD                    Clinical Impression:   Final diagnoses:  [H81.10] BPPV (benign paroxysmal positional vertigo), unspecified laterality (Primary)        ED Disposition Condition    Discharge Stable          ED Prescriptions       Medication Sig Dispense Start Date End Date Auth. Provider    meclizine (ANTIVERT) 25 mg tablet Take 2 tablets (50 mg total) by mouth 2 (two) times daily as needed for Dizziness. 60 tablet 10/9/2023 -- Antonio Babb MD          Follow-up Information       Follow up With Specialties Details Why Contact Info    Keep appointment with physical therapist for vestibular rehab  Go to       [facility] Otolaryngology Call   [address] [facility]  [place] [address]    [facility] - Emergency Dept Emergency Medicine Go to  If symptoms worsen [address]  [facility] [address] [phone]             Antonio Babb MD  10/09/23 0519

## 2023-10-09 NOTE — Clinical Note
"Larisa Robleslux"Amrit was seen and treated in our emergency department on 10/9/2023.  She may return to work on 10/11/2023.       If you have any questions or concerns, please don't hesitate to call.      Tamra BAILEY    "

## 2023-10-25 ENCOUNTER — OFFICE VISIT (OUTPATIENT)
Dept: INTERNAL MEDICINE | Facility: CLINIC | Age: 43
End: 2023-10-25
Payer: MEDICAID

## 2023-10-25 VITALS
HEIGHT: 67 IN | RESPIRATION RATE: 18 BRPM | OXYGEN SATURATION: 98 % | DIASTOLIC BLOOD PRESSURE: 76 MMHG | TEMPERATURE: 98 F | SYSTOLIC BLOOD PRESSURE: 114 MMHG | BODY MASS INDEX: 45.99 KG/M2 | WEIGHT: 293 LBS | HEART RATE: 68 BPM

## 2023-10-25 DIAGNOSIS — M54.2 NECK PAIN: ICD-10-CM

## 2023-10-25 DIAGNOSIS — R42 VERTIGO: ICD-10-CM

## 2023-10-25 DIAGNOSIS — E66.01 CLASS 3 OBESITY: Primary | ICD-10-CM

## 2023-10-25 DIAGNOSIS — F41.1 GENERALIZED ANXIETY DISORDER: ICD-10-CM

## 2023-10-25 PROCEDURE — 99214 OFFICE O/P EST MOD 30 MIN: CPT | Mod: PBBFAC | Performed by: STUDENT IN AN ORGANIZED HEALTH CARE EDUCATION/TRAINING PROGRAM

## 2023-10-25 RX ORDER — FLUTICASONE PROPIONATE 50 MCG
2 SPRAY, SUSPENSION (ML) NASAL DAILY
COMMUNITY
Start: 2023-10-17

## 2023-10-25 RX ORDER — CYCLOBENZAPRINE HCL 10 MG
10 TABLET ORAL 3 TIMES DAILY
COMMUNITY
Start: 2023-10-23

## 2023-10-25 RX ORDER — AMOXICILLIN 875 MG/1
875 TABLET, FILM COATED ORAL 2 TIMES DAILY
COMMUNITY
Start: 2023-10-17 | End: 2023-10-25

## 2023-10-25 RX ORDER — NAPROXEN 500 MG/1
500 TABLET ORAL 2 TIMES DAILY
COMMUNITY
Start: 2023-10-23

## 2023-10-25 RX ORDER — PREDNISONE 20 MG/1
20 TABLET ORAL 2 TIMES DAILY
COMMUNITY
Start: 2023-10-17 | End: 2023-10-25

## 2023-10-25 RX ORDER — METHYLPREDNISOLONE 4 MG/1
TABLET ORAL
COMMUNITY
Start: 2023-10-23 | End: 2023-10-25

## 2023-10-25 NOTE — PROGRESS NOTES
Subjective     Patient ID: Larisa Marcus is a 43 y.o. female.    Chief Complaint: Follow-up (Vertigo today, ED recently for pain head(pinched nerve))    Patient is a 42-year-old female with past medical history of lower extremity swelling and vertigo who presents to [facility] for scheduled 2 month follow-up.  Patient states vertigo has not improved much, she is been taking meclizine daily and still has continued symptoms.  Patient went to physical therapy for vestibular rehabilitation, seemed to have mild improvement but not much.  Patient is scheduled for ENT appointment later this week.  Patient states she has also had pain in her neck, seems to be nerve related and happens worse in the morning when she wakes up.  Patient otherwise trying to stay active, maintain balanced diet.  Taking all of her medicines as prescribed.  No other acute concerns at this time    Follow-up  Associated symptoms include headaches, neck pain and vertigo. Pertinent negatives include no abdominal pain, arthralgias or fever.     Review of Systems   Constitutional:  Negative for activity change and fever.   HENT:  Negative for rhinorrhea and trouble swallowing.    Respiratory:  Negative for chest tightness and wheezing.    Gastrointestinal:  Negative for abdominal pain.   Genitourinary:  Negative for dysuria.   Musculoskeletal:  Positive for neck pain. Negative for arthralgias.   Neurological:  Positive for vertigo and headaches.   Psychiatric/Behavioral:  The patient is nervous/anxious.         Objective     Physical Exam  Constitutional:       General: She is not in acute distress.     Appearance: Normal appearance. She is obese.   HENT:      Head: Normocephalic and atraumatic.   Cardiovascular:      Rate and Rhythm: Regular rhythm.      Heart sounds: Normal heart sounds.   Pulmonary:      Effort: Pulmonary effort is normal. No respiratory distress.      Breath sounds: Normal breath sounds.   Abdominal:       Palpations: Abdomen is soft.      Tenderness: There is no abdominal tenderness.   Musculoskeletal:         General: Tenderness present. No swelling or deformity. Normal range of motion.      Right lower leg: No edema.      Left lower leg: No edema.   Neurological:      General: No focal deficit present.      Mental Status: She is alert and oriented to person, place, and time.        Assessment and Plan     1. Class 3 obesity    2. Generalized anxiety disorder    3. Vertigo    4. Neck pain    Benign paroxysmal positional vertigo   -patient states she gets symptoms daily , CT done at ER negative for any intracranial pathology  -can continue using meclizine as needed  -should continue to go to vestibular rehabilitation as scheduled   -further recommendations per ENT at visit later this week    Neck pain   -was previously given muscle relaxer, takes Tylenol as needed   -provided instructions on neck exercises, continue to use NSAIDs as needed.    Defers vaccines at this time   Lipid panel and hep C with next labs ordered   Order mammogram  RTC 3 months    [facility]       Follow up in about 3 months (around 1/25/2024).

## 2023-11-08 ENCOUNTER — OFFICE VISIT (OUTPATIENT)
Dept: OTOLARYNGOLOGY | Facility: CLINIC | Age: 43
End: 2023-11-08
Payer: MEDICAID

## 2023-11-08 DIAGNOSIS — H91.90 HEARING DISORDER, UNSPECIFIED LATERALITY: ICD-10-CM

## 2023-11-08 DIAGNOSIS — R42 DIZZINESS: Primary | ICD-10-CM

## 2023-11-08 PROCEDURE — 1159F PR MEDICATION LIST DOCUMENTED IN MEDICAL RECORD: ICD-10-PCS | Mod: CPTII,95,, | Performed by: NURSE PRACTITIONER

## 2023-11-08 PROCEDURE — 1159F MED LIST DOCD IN RCRD: CPT | Mod: CPTII,95,, | Performed by: NURSE PRACTITIONER

## 2023-11-08 PROCEDURE — 99203 PR OFFICE/OUTPT VISIT, NEW, LEVL III, 30-44 MIN: ICD-10-PCS | Mod: 95,,, | Performed by: NURSE PRACTITIONER

## 2023-11-08 PROCEDURE — 99203 OFFICE O/P NEW LOW 30 MIN: CPT | Mod: 95,,, | Performed by: NURSE PRACTITIONER

## 2023-11-08 RX ORDER — MECLIZINE HYDROCHLORIDE 25 MG/1
50 TABLET ORAL 2 TIMES DAILY PRN
Qty: 60 TABLET | Refills: 0 | Status: SHIPPED | OUTPATIENT
Start: 2023-11-08 | End: 2024-02-15

## 2023-11-08 NOTE — PROGRESS NOTES
Audio Only Telehealth Visit     The patient location is: state [facility]  The chief complaint leading to consultation is: dizziness  Visit type: Virtual visit with audio only (telephone)  Total time spent on visit: 30 min     The reason for the audio only service rather than synchronous audio and video virtual visit was related to technical difficulties or patient preference/necessity.     Each patient to whom I provide medical services by telemedicine is:  (1) informed of the relationship between the physician and patient and the respective role of any other health care provider with respect to management of the patient; and (2) notified that they may decline to receive medical services by telemedicine and may withdraw from such care at any time. Patient verbally consented to receive this service via voice-only telephone call.       HPI: 43yoF referred for dizziness. States she had an initial episode of dizziness while driving 2-3 months ago wherein she got hot and anxious. She was able to drive herself to the ED. EKG and labs were unremarkable. States dizziness occurs with movement, sometimes walking, turning, and during sleep. Unable to say if turning during sleep is what exacerbates this. States she went to therapy to try to put her crystals back in place but that the therapist told her that he did not think the problem was with her crystals. She felt the exercises may have helped a little. Episodes occur 2-3x/day. She describes this as a sensation of spinning that lasts 5-10min. Sometimes she feels that she might pass out and feels this gives her anxiety attacks. Denies HL or aural pressure. Endorses infrequent, brief tinnitus. Denies any previous otologic hx. Denies medication changes, head trauma, or MVC's. Meclizine provides partial relief.        Imaging:       Assessment and plan: 43yoF referred for dizziness of unclear etiology. She has had unremarkable EKG, echo, labs, & CT head. Will r/o vestibulopathy.  -  Safety precautions  - Ok to continue meclizine prn  - Vestibular battery  - RTC 6-8 weeks     Leena Mitchell NP    This service was not originating from a related E/M service provided within the previous 7 days nor will  to an E/M service or procedure within the next 24 hours or my soonest available appointment.  Prevailing standard of care was able to be met in this audio-only visit.

## 2023-11-09 ENCOUNTER — DOCUMENTATION ONLY (OUTPATIENT)
Dept: AUDIOLOGY | Facility: HOSPITAL | Age: 43
End: 2023-11-09
Payer: MEDICAID

## 2023-11-09 NOTE — PROGRESS NOTES
Spoke with patient regarding pre-test protocol for VNG.  She reportedly will discontinue Meclizine at least 48 hours prior to testing and adhere to a light breakfast the morning of.  In addition, she denies a history of otologic procedure and vision problems to include implants or blindness. Mrs. Marcus endorses increased anxiety as related to her dizziness, therefore will ensure she is well informed of test process.

## 2024-01-10 ENCOUNTER — CLINICAL SUPPORT (OUTPATIENT)
Dept: AUDIOLOGY | Facility: HOSPITAL | Age: 44
End: 2024-01-10
Payer: MEDICAID

## 2024-01-10 DIAGNOSIS — H81.90 DISORDER OF VESTIBULAR FUNCTION, UNSPECIFIED LATERALITY: Primary | ICD-10-CM

## 2024-01-10 DIAGNOSIS — H91.90 HEARING DISORDER, UNSPECIFIED LATERALITY: ICD-10-CM

## 2024-01-10 DIAGNOSIS — R42 DIZZINESS: ICD-10-CM

## 2024-01-10 PROCEDURE — 92538 CALORIC VSTBLR TEST W/REC: CPT | Performed by: AUDIOLOGIST-HEARING AID FITTER

## 2024-01-10 PROCEDURE — 92567 TYMPANOMETRY: CPT | Performed by: AUDIOLOGIST-HEARING AID FITTER

## 2024-01-10 PROCEDURE — 92557 COMPREHENSIVE HEARING TEST: CPT | Performed by: AUDIOLOGIST-HEARING AID FITTER

## 2024-01-10 PROCEDURE — 92540 BASIC VESTIBULAR EVALUATION: CPT | Performed by: AUDIOLOGIST-HEARING AID FITTER

## 2024-01-10 NOTE — PROGRESS NOTES
Audiological/Vestibular Evaluation      Patient is a 43 y.o. female presenting with episodes of light-headedness and sensations of syncope.  The initial episode began a few months prior to today's visit due to unknown etiology.  It is described as light-headedness lasting seconds/minutes and alleviated by Meclizine.  Mrs. Marcus denies any warning signs prior to events as well as any other associated symptoms.  She reportedly will experience symptoms while resting or remaining still.  Vertigo is denied while rolling over in bed/changing of positions.  She also denies a change in medication nor new medical diagnosis.    Pure Tone Testing:     Right ear:   Normal hearing sensitivity for the frequencies 250-8000 Hz    Left ear: Normal hearing sensitivity for the frequencies 250-8000 Hz    Tympanometry:      Right ear:   Type 'A' tympanogram     Left ear: Type 'A' tympanogram     DPOAE Testing:    Right ear: Present emissions 1500, 2000 and 6000 Hz    Left ear:  Present emissions 4038-8672 Hz        Vestibular Results:    Gaze:   No gaze-evoked nystagmus.    Saccades:   Normal saccade velocity, latency and accuracy.   Tracking: Normal tracking accuracy and gain.  OPK:    No asymmetry noted.   Positionals: Slight, left beating nystagmus recorded for static head left position only with vision denied; otherwise, positionals within normal limits.  Positioning: No rotary nystagmus noted for DHP maneuver head right/left.   Calorics: Reduced responses noted due to noncompliance of restricted medication (I.e. Meclizine consumed 16 hours prior to testing)    Caloric vestibular responses: RC 3, RW *, LW *, LC 4 deg/s.        Interpretations:    Pure tone testing revealed normal hearing sensitivity for the frequencies 250-8000 Hz, bilaterally.  Speech reception thresholds were obtained at 15 dB HL, bilaterally, consistent with pure tone testing.  Word recognition scores were excellent, bilaterally. Tympanometry testing  revealed Type A tympanograms, bilaterally, indicative of normal middle ear function.  DPOAE testing revealed present emissions for the frequencies noted indicative of normal cochlear physiology, bilaterally.  Otoscopy revealed clear EACs, bilaterally.     This patient's videonystagmogram was normal.  Oculomotor testing was within normal limits. Positional testing did not reveal (clinically significant) nystagmus for all test conditions with vision denied. Positioning testing did not reveal any type of nystagmus and therefore, negative for canalith involvement.  The caloric irrigation test revealed reduced caloric responses due to noncompliance with restricted medication, therefore testing is inconclusive.  Todays findings indicative of normal peripheral involvement, bilaterally.     Recommendations:        Return to referring ENT for further follow up     Lizzette Owen  Clinical Audiologist

## 2024-01-11 ENCOUNTER — HOSPITAL ENCOUNTER (EMERGENCY)
Facility: HOSPITAL | Age: 44
Discharge: HOME | End: 2024-01-11
Attending: INTERNAL MEDICINE
Payer: MEDICAID

## 2024-01-11 VITALS
SYSTOLIC BLOOD PRESSURE: 111 MMHG | RESPIRATION RATE: 20 BRPM | HEART RATE: 77 BPM | HEIGHT: 67 IN | WEIGHT: 293 LBS | BODY MASS INDEX: 45.99 KG/M2 | TEMPERATURE: 98 F | DIASTOLIC BLOOD PRESSURE: 71 MMHG | OXYGEN SATURATION: 99 %

## 2024-01-11 DIAGNOSIS — R42 DIZZINESS: Primary | ICD-10-CM

## 2024-01-11 PROCEDURE — 99283 EMERGENCY DEPT VISIT LOW MDM: CPT

## 2024-01-11 PROCEDURE — 25000003 PHARM REV CODE 250: Performed by: INTERNAL MEDICINE

## 2024-01-11 RX ORDER — ONDANSETRON 4 MG/1
4 TABLET, ORALLY DISINTEGRATING ORAL ONCE
Status: COMPLETED | OUTPATIENT
Start: 2024-01-11 | End: 2024-01-11

## 2024-01-11 RX ADMIN — ONDANSETRON 4 MG: 4 TABLET, ORALLY DISINTEGRATING ORAL at 07:01

## 2024-01-12 ENCOUNTER — OFFICE VISIT (OUTPATIENT)
Dept: OTOLARYNGOLOGY | Facility: CLINIC | Age: 44
End: 2024-01-12
Payer: MEDICAID

## 2024-01-12 DIAGNOSIS — R42 DIZZINESS: Primary | ICD-10-CM

## 2024-01-12 PROCEDURE — 1159F MED LIST DOCD IN RCRD: CPT | Mod: CPTII,95,, | Performed by: NURSE PRACTITIONER

## 2024-01-12 PROCEDURE — 99213 OFFICE O/P EST LOW 20 MIN: CPT | Mod: 95,,, | Performed by: NURSE PRACTITIONER

## 2024-01-12 NOTE — PROGRESS NOTES
Audio Only Telehealth Visit     The patient location is: [facility]  The chief complaint leading to consultation is: f/u  Visit type: Virtual visit with audio only (telephone)  Total time spent on visit: 20 min     The reason for the audio only service rather than synchronous audio and video virtual visit was related to technical difficulties or patient preference/necessity.     Each patient to whom I provide medical services by telemedicine is:  (1) informed of the relationship between the physician and patient and the respective role of any other health care provider with respect to management of the patient; and (2) notified that they may decline to receive medical services by telemedicine and may withdraw from such care at any time. Patient verbally consented to receive this service via voice-only telephone call.       HPI: 11/8/23: 43yoF referred for dizziness. States she had an initial episode of dizziness while driving 2-3 months ago wherein she got hot and anxious. She was able to drive herself to the ED. EKG and labs were unremarkable. States dizziness occurs with movement, sometimes walking, turning, and during sleep. Unable to say if turning during sleep is what exacerbates this. States she went to therapy to try to put her crystals back in place but that the therapist told her that he did not think the problem was with her crystals. She felt the exercises may have helped a little. Episodes occur 2-3x/day. She describes this as a sensation of spinning that lasts 5-10min. Sometimes she feels that she might pass out and feels this gives her anxiety attacks. Denies HL or aural pressure. Endorses infrequent, brief tinnitus. Denies any previous otologic hx. Denies medication changes, head trauma, or MVC's. Meclizine provides partial relief.     01/12/2024: F/u after vestibular battery. No change in dizziness. States she finds it distressing and scary.    Audio:        Assessment and plan:  43 y.o. female  eferred for dizziness of unclear etiology. She has had unremarkable EKG, echo, labs, & CT head. Vestibular battery unremarkable for cause & audio WNL- reviewed.   - Safety precautions  - Ok to continue meclizine prn  - F/u with PCP  - RTC prn      Leena Mitchell NP      This service was not originating from a related E/M service provided within the previous 7 days nor will  to an E/M service or procedure within the next 24 hours or my soonest available appointment.  Prevailing standard of care was able to be met in this audio-only visit.

## 2024-01-12 NOTE — ED PROVIDER NOTES
"     Source of History:  Patient, no limitations    Chief complaint:  Dizziness ( c/o vertigo to which she takes meclizine and also reports having a "test done" yesterday at her ENT. Today she feels ""light headed" and anxious. Reports she feels like her heart is racing)      HPI:  Larisa Marcus is a 43 y.o. female presenting with Dizziness ( c/o vertigo to which she takes meclizine and also reports having a "test done" yesterday at her ENT. Today she feels ""light headed" and anxious. Reports she feels like her heart is racing)    Patient presents with dizziness .  The dizziness has been present for a few hours. The patient describes the symptoms as disequalibirum, vertigo, lightheadedness, and near syncope. Symptoms are exacerbated by motion and head movements The patient also complains of  occasional tinnitus .Previous work up has been EKG, echo, labs, & CT head.        Review of Systems   Constitutional symptoms:  Negative except as documented in HPI.   Skin symptoms:  Negative except as documented in HPI.   HEENT symptoms:  Negative except as documented in HPI.   Respiratory symptoms:  Negative except as documented in HPI.   Cardiovascular symptoms:  Negative except as documented in HPI.   Gastrointestinal symptoms:  Negative except as documented in HPI.    Genitourinary symptoms:  Negative except as documented in HPI.   Musculoskeletal symptoms:  Negative except as documented in HPI.   Neurologic symptoms:  Negative except as documented in HPI.   Psychiatric symptoms:  Negative except as documented in HPI.   Allergy/immunologic symptoms:  Negative except as documented in HPI.             Additional review of systems information: All other systems reviewed and otherwise negative.      Review of patient's allergies indicates:  No Known Allergies    PMH:  As per HPI and below:    Past Medical History:   Diagnosis Date    Benign paroxysmal vertigo, bilateral         Family History   Problem Relation Age of " "Onset    Diabetes Mother     No Known Problems Father     Diabetes Sister     Diabetes Brother        Past Surgical History:   Procedure Laterality Date     SECTION      x1       Social History     Tobacco Use    Smoking status: Every Day     Average packs/day: 0.1 packs/day for 36.0 years (5.0 ttl pk-yrs)     Types: Cigarettes     Start date: 1998    Smokeless tobacco: Never   Substance Use Topics    Alcohol use: Not Currently    Drug use: Never       Patient Active Problem List   Diagnosis    Localized swelling of both lower extremities    Generalized anxiety disorder    Class 3 obesity    Vertigo    Neck pain        Physical Exam:    /71 (BP Location: Left forearm, Patient Position: Sitting)   Pulse 77   Temp 98 °F (36.7 °C) (Oral)   Resp 20   Ht 5' 7" (1.702 m)   Wt (!) 170.1 kg (375 lb)   SpO2 99%   BMI 58.73 kg/m²     Nursing note and vital signs reviewed.    General:  Alert, no acute distress.   Skin: Normal for Ethnic Origin, No cyanosis  HEENT: Normocephalic and atraumatic, Vision unchanged, Pupils symmetric, No icterus , Nasal mucosa is pink and moist  Cardiovascular:  Regular rate and rhythm, No edema  Chest Wall: No deformity, equal chest rise  Respiratory:  Lungs are clear to auscultation, respirations are non-labored.    Musculoskeletal:  No deformity, Normal perfusion to all extremities  Gastrointestinal:  Soft, Non distended  Neurological:  Alert and oriented, normal motor observed, normal speech observed.    Psychiatric:  Cooperative, appropriate mood & affect.        Labs that have been ordered have been independently reviewed and interpreted by myself.     Old Chart Reviewed.      Initial Impression/ Differential Dx:  Peripheral vertigo, inner ear disease, vestibular neuritis, migraine headache, meniere disease, vestibular tumor, CVA, carotid disease or dissection, orthostatic hypotension, dehydration, electrolyte abnormality, anemia, arrhythmia, myocardial " ischemia      MDM:      Reviewed Nurses Note.    Reviewed Pertinent old records.    Orders Placed This Encounter    ondansetron disintegrating tablet 4 mg                    Labs Reviewed - No data to display       No orders to display        No visits with results within 1 Day(s) from this visit.   Latest known visit with results is:   Admission on 10/09/2023, Discharged on 10/09/2023   Component Date Value Ref Range Status    Sodium Level 10/09/2023 140  136 - 145 mmol/L Final    Potassium Level 10/09/2023 3.9  3.5 - 5.1 mmol/L Final    Chloride 10/09/2023 106  98 - 107 mmol/L Final    Carbon Dioxide 10/09/2023 24  22 - 29 mmol/L Final    Glucose Level 10/09/2023 110 (H)  74 - 100 mg/dL Final    Blood Urea Nitrogen 10/09/2023 11.1  7.0 - 18.7 mg/dL Final    Creatinine 10/09/2023 0.70  0.55 - 1.02 mg/dL Final    Calcium Level Total 10/09/2023 9.5  8.4 - 10.2 mg/dL Final    Protein Total 10/09/2023 6.9  6.4 - 8.3 gm/dL Final    Albumin Level 10/09/2023 3.3 (L)  3.5 - 5.0 g/dL Final    Globulin 10/09/2023 3.6 (H)  2.4 - 3.5 gm/dL Final    Albumin/Globulin Ratio 10/09/2023 0.9 (L)  1.1 - 2.0 ratio Final    Bilirubin Total 10/09/2023 0.2  <=1.5 mg/dL Final    Alkaline Phosphatase 10/09/2023 81  40 - 150 unit/L Final    Alanine Aminotransferase 10/09/2023 8  0 - 55 unit/L Final    Aspartate Aminotransferase 10/09/2023 13  5 - 34 unit/L Final    eGFR 10/09/2023 >60  mls/min/1.73/m2 Final    WBC 10/09/2023 8.50  4.50 - 11.50 x10(3)/mcL Final    RBC 10/09/2023 4.93  4.20 - 5.40 x10(6)/mcL Final    Hgb 10/09/2023 13.1  12.0 - 16.0 g/dL Final    Hct 10/09/2023 41.1  37.0 - 47.0 % Final    MCV 10/09/2023 83.4  80.0 - 94.0 fL Final    MCH 10/09/2023 26.6 (L)  27.0 - 31.0 pg Final    MCHC 10/09/2023 31.9 (L)  33.0 - 36.0 g/dL Final    RDW 10/09/2023 14.1  11.5 - 17.0 % Final    Platelet 10/09/2023 357  130 - 400 x10(3)/mcL Final    MPV 10/09/2023 10.0  7.4 - 10.4 fL Final    Neut % 10/09/2023 40.1  % Final    Lymph % 10/09/2023  47.4  % Final    Mono % 10/09/2023 6.0  % Final    Eos % 10/09/2023 5.6  % Final    Basophil % 10/09/2023 0.7  % Final    Lymph # 10/09/2023 4.03  0.6 - 4.6 x10(3)/mcL Final    Neut # 10/09/2023 3.40  2.1 - 9.2 x10(3)/mcL Final    Mono # 10/09/2023 0.51  0.1 - 1.3 x10(3)/mcL Final    Eos # 10/09/2023 0.48  0 - 0.9 x10(3)/mcL Final    Baso # 10/09/2023 0.06  <=0.2 x10(3)/mcL Final    IG# 10/09/2023 0.02  0 - 0.04 x10(3)/mcL Final    IG% 10/09/2023 0.2  % Final    NRBC% 10/09/2023 0.0  % Final    Beta hCG Qualitative, Urine 10/09/2023 Negative  Negative Final    Color, UA 10/09/2023 Straw  Yellow, Light-Yellow, Dark Yellow, Rosario, Straw Final    Appearance, UA 10/09/2023 Clear  Clear Final    Specific Gravity, UA 10/09/2023 1.015  1.005 - 1.030 Final    pH, UA 10/09/2023 7.5  5.0 - 8.5 Final    Protein, UA 10/09/2023 Negative  Negative Final    Glucose, UA 10/09/2023 Negative  Negative, Normal Final    Ketones, UA 10/09/2023 Negative  Negative Final    Blood, UA 10/09/2023 Negative  Negative Final    Bilirubin, UA 10/09/2023 Negative  Negative Final    Urobilinogen, UA 10/09/2023 1.0  0.2, 1.0, Normal Final    Nitrites, UA 10/09/2023 Negative  Negative Final    Leukocyte Esterase, UA 10/09/2023 Negative  Negative Final       Imaging Results    None                                              Diagnostic Impression:    1. Dizziness         ED Disposition Condition    Discharge Stable             Follow-up Information       [facility] - Emergency Dept.    Specialty: Emergency Medicine  Why: If symptoms worsen  Contact information:  [address]  [facility] [address] [phone]                            ED Prescriptions    None       Follow-up Information       Follow up With Specialties Details Why Contact Info    [facility] - Emergency Dept Emergency Medicine  If symptoms worsen [address]  [facility]  [address]  [phone]             Chris Lao DO  01/11/24 1936

## 2024-02-12 ENCOUNTER — HOSPITAL ENCOUNTER (EMERGENCY)
Facility: HOSPITAL | Age: 44
Discharge: HOME | End: 2024-02-12
Attending: EMERGENCY MEDICINE
Payer: MEDICAID

## 2024-02-12 VITALS
BODY MASS INDEX: 45.99 KG/M2 | SYSTOLIC BLOOD PRESSURE: 128 MMHG | DIASTOLIC BLOOD PRESSURE: 95 MMHG | WEIGHT: 293 LBS | RESPIRATION RATE: 20 BRPM | TEMPERATURE: 99 F | HEART RATE: 72 BPM | HEIGHT: 67 IN | OXYGEN SATURATION: 98 %

## 2024-02-12 DIAGNOSIS — R07.9 CHEST PAIN: ICD-10-CM

## 2024-02-12 DIAGNOSIS — R07.89 ATYPICAL CHEST PAIN: Primary | ICD-10-CM

## 2024-02-12 DIAGNOSIS — U07.1 COVID-19: ICD-10-CM

## 2024-02-12 LAB
ALBUMIN SERPL-MCNC: 3.2 G/DL (ref 3.5–5)
ALBUMIN/GLOB SERPL: 0.8 RATIO (ref 1.1–2)
ALP SERPL-CCNC: 79 UNIT/L (ref 40–150)
ALT SERPL-CCNC: 9 UNIT/L (ref 0–55)
AST SERPL-CCNC: 13 UNIT/L (ref 5–34)
BASOPHILS # BLD AUTO: 0.03 X10(3)/MCL
BASOPHILS NFR BLD AUTO: 0.4 %
BILIRUB SERPL-MCNC: 0.2 MG/DL
BNP BLD-MCNC: 45.5 PG/ML
BUN SERPL-MCNC: 7 MG/DL (ref 7–18.7)
CALCIUM SERPL-MCNC: 8.9 MG/DL (ref 8.4–10.2)
CHLORIDE SERPL-SCNC: 106 MMOL/L (ref 98–107)
CO2 SERPL-SCNC: 26 MMOL/L (ref 22–29)
CREAT SERPL-MCNC: 0.69 MG/DL (ref 0.55–1.02)
EOSINOPHIL # BLD AUTO: 0.24 X10(3)/MCL (ref 0–0.9)
EOSINOPHIL NFR BLD AUTO: 3.1 %
ERYTHROCYTE [DISTWIDTH] IN BLOOD BY AUTOMATED COUNT: 13.8 % (ref 11.5–17)
FLUAV AG UPPER RESP QL IA.RAPID: NOT DETECTED
FLUBV AG UPPER RESP QL IA.RAPID: NOT DETECTED
GFR SERPLBLD CREATININE-BSD FMLA CKD-EPI: >60 MLS/MIN/1.73/M2
GLOBULIN SER-MCNC: 3.8 GM/DL (ref 2.4–3.5)
GLUCOSE SERPL-MCNC: 127 MG/DL (ref 74–100)
HCT VFR BLD AUTO: 42.8 % (ref 37–47)
HGB BLD-MCNC: 13.5 G/DL (ref 12–16)
IMM GRANULOCYTES # BLD AUTO: 0.04 X10(3)/MCL (ref 0–0.04)
IMM GRANULOCYTES NFR BLD AUTO: 0.5 %
LYMPHOCYTES # BLD AUTO: 3.66 X10(3)/MCL (ref 0.6–4.6)
LYMPHOCYTES NFR BLD AUTO: 46.7 %
MCH RBC QN AUTO: 26.5 PG (ref 27–31)
MCHC RBC AUTO-ENTMCNC: 31.5 G/DL (ref 33–36)
MCV RBC AUTO: 84.1 FL (ref 80–94)
MONOCYTES # BLD AUTO: 0.38 X10(3)/MCL (ref 0.1–1.3)
MONOCYTES NFR BLD AUTO: 4.8 %
NEUTROPHILS # BLD AUTO: 3.49 X10(3)/MCL (ref 2.1–9.2)
NEUTROPHILS NFR BLD AUTO: 44.5 %
PLATELET # BLD AUTO: 364 X10(3)/MCL (ref 130–400)
PMV BLD AUTO: 9.8 FL (ref 7.4–10.4)
POTASSIUM SERPL-SCNC: 3.8 MMOL/L (ref 3.5–5.1)
PROT SERPL-MCNC: 7 GM/DL (ref 6.4–8.3)
RBC # BLD AUTO: 5.09 X10(6)/MCL (ref 4.2–5.4)
SARS-COV-2 RNA RESP QL NAA+PROBE: DETECTED
SODIUM SERPL-SCNC: 141 MMOL/L (ref 136–145)
TROPONIN I SERPL-MCNC: <0.01 NG/ML (ref 0–0.04)
WBC # SPEC AUTO: 7.84 X10(3)/MCL (ref 4.5–11.5)

## 2024-02-12 PROCEDURE — 80053 COMPREHEN METABOLIC PANEL: CPT | Performed by: EMERGENCY MEDICINE

## 2024-02-12 PROCEDURE — 0240U COVID/FLU A&B PCR: CPT | Performed by: EMERGENCY MEDICINE

## 2024-02-12 PROCEDURE — 93010 ELECTROCARDIOGRAM REPORT: CPT | Mod: ,,, | Performed by: INTERNAL MEDICINE

## 2024-02-12 PROCEDURE — 96374 THER/PROPH/DIAG INJ IV PUSH: CPT

## 2024-02-12 PROCEDURE — 99285 EMERGENCY DEPT VISIT HI MDM: CPT | Mod: 25

## 2024-02-12 PROCEDURE — 63600175 PHARM REV CODE 636 W HCPCS: Performed by: EMERGENCY MEDICINE

## 2024-02-12 PROCEDURE — 85025 COMPLETE CBC W/AUTO DIFF WBC: CPT | Performed by: EMERGENCY MEDICINE

## 2024-02-12 PROCEDURE — 84484 ASSAY OF TROPONIN QUANT: CPT | Performed by: EMERGENCY MEDICINE

## 2024-02-12 PROCEDURE — 83880 ASSAY OF NATRIURETIC PEPTIDE: CPT | Performed by: EMERGENCY MEDICINE

## 2024-02-12 PROCEDURE — 93005 ELECTROCARDIOGRAM TRACING: CPT

## 2024-02-12 RX ORDER — LORAZEPAM 2 MG/ML
1 INJECTION INTRAMUSCULAR
Status: DISCONTINUED | OUTPATIENT
Start: 2024-02-12 | End: 2024-02-13 | Stop reason: HOSPADM

## 2024-02-12 RX ORDER — KETOROLAC TROMETHAMINE 30 MG/ML
30 INJECTION, SOLUTION INTRAMUSCULAR; INTRAVENOUS
Status: COMPLETED | OUTPATIENT
Start: 2024-02-12 | End: 2024-02-12

## 2024-02-12 RX ADMIN — KETOROLAC TROMETHAMINE 30 MG: 30 INJECTION, SOLUTION INTRAMUSCULAR; INTRAVENOUS at 10:02

## 2024-02-13 LAB
OHS QRS DURATION: 92 MS
OHS QTC CALCULATION: 467 MS

## 2024-02-13 NOTE — ED PROVIDER NOTES
"Encounter Date: 2024       History     Chief Complaint   Patient presents with    Chest Pain     Pt reports stabbing midsternal chest  pain starting yesterday. Pt reports feeling dizzy earlier today and took 2 meclinzine around 1500, no longer feeling dizzy.      The history is provided by the patient.   Chest Pain  The current episode started yesterday. Duration of episode(s) is 1 day. Chest pain occurs intermittently. The chest pain is unchanged. The quality of the pain is described as dull. The pain does not radiate. Chest pain is worsened by certain positions. Primary symptoms include shortness of breath and dizziness. Pertinent negatives for primary symptoms include no fever and no nausea.   The shortness of breath began today.   She describes the dizziness as lightheadedness. The dizziness began more than 1 week ago. Dizziness does not occur with nausea or weakness.   Pertinent negatives for associated symptoms include no weakness. She tried medication for the symptoms. Risk factors include obesity.   States pain is worse when she is lying on her stomach - had trouble sleeping last night because she felt like she was smothering.  States, "I felt restless"    Review of patient's allergies indicates:  No Known Allergies  Past Medical History:   Diagnosis Date    Benign paroxysmal vertigo, bilateral      Past Surgical History:   Procedure Laterality Date     SECTION      x1     Family History   Problem Relation Age of Onset    Diabetes Mother     No Known Problems Father     Diabetes Sister     Diabetes Brother      Social History     Tobacco Use    Smoking status: Every Day     Average packs/day: 0.1 packs/day for 36.1 years (5.0 ttl pk-yrs)     Types: Cigarettes     Start date: 1998    Smokeless tobacco: Never   Substance Use Topics    Alcohol use: Not Currently    Drug use: Never     Review of Systems   Constitutional:  Negative for fever.   HENT:  Negative for sore throat.    Respiratory:  " Positive for shortness of breath.    Cardiovascular:  Positive for chest pain.   Gastrointestinal:  Negative for nausea.   Genitourinary:  Negative for dysuria.   Musculoskeletal:  Negative for back pain.   Skin:  Negative for rash.   Neurological:  Positive for dizziness. Negative for weakness.   Hematological:  Does not bruise/bleed easily.       Physical Exam     Initial Vitals [02/12/24 2218]   BP Pulse Resp Temp SpO2   (!) 151/93 93 20 98.5 °F (36.9 °C) 99 %      MAP       --         Physical Exam    Nursing note and vitals reviewed.  Constitutional: She appears well-developed and well-nourished.   HENT:   Head: Normocephalic and atraumatic.   Right Ear: External ear normal.   Left Ear: External ear normal.   Eyes: Conjunctivae and EOM are normal. Pupils are equal, round, and reactive to light.   Neck: Neck supple.   Normal range of motion.  Cardiovascular:  Normal rate, regular rhythm, normal heart sounds and intact distal pulses.           Pulmonary/Chest: Breath sounds normal.   Abdominal: Abdomen is soft. Bowel sounds are normal.   Morbidly obese   Musculoskeletal:         General: Normal range of motion.      Cervical back: Normal range of motion and neck supple.     Neurological: She is alert and oriented to person, place, and time. GCS score is 15. GCS eye subscore is 4. GCS verbal subscore is 5. GCS motor subscore is 6.   Skin: Skin is warm and dry. Capillary refill takes less than 2 seconds.   Psychiatric: Her behavior is normal. Judgment and thought content normal. Her mood appears anxious.         ED Course   Procedures  Labs Reviewed   COMPREHENSIVE METABOLIC PANEL - Abnormal; Notable for the following components:       Result Value    Glucose Level 127 (*)     Albumin Level 3.2 (*)     Globulin 3.8 (*)     Albumin/Globulin Ratio 0.8 (*)     All other components within normal limits   COVID/FLU A&B PCR - Abnormal; Notable for the following components:    SARS-CoV-2 PCR Detected (*)     All other  components within normal limits    Narrative:     The Xpert Xpress SARS-CoV-2/FLU/RSV plus is a rapid, multiplexed real-time PCR test intended for the simultaneous qualitative detection and differentiation of SARS-CoV-2, Influenza A, Influenza B, and respiratory syncytial virus (RSV) viral RNA in either nasopharyngeal swab or nasal swab specimens.         CBC WITH DIFFERENTIAL - Abnormal; Notable for the following components:    MCH 26.5 (*)     MCHC 31.5 (*)     All other components within normal limits   B-TYPE NATRIURETIC PEPTIDE - Normal   TROPONIN I - Normal   CBC W/ AUTO DIFFERENTIAL    Narrative:     The following orders were created for panel order CBC auto differential.  Procedure                               Abnormality         Status                     ---------                               -----------         ------                     CBC with Differential[[id number]]       Abnormal            Final result                 Please view results for these tests on the individual orders.     EKG Readings: (Independently Interpreted)   Initial Reading: No STEMI. Rhythm: Normal Sinus Rhythm. Heart Rate: 73. Ectopy: No Ectopy. Conduction: Normal. ST Segments: Normal ST Segments. T Waves: Normal. Axis: Normal. Clinical Impression: Normal Sinus Rhythm       Imaging Results              X-Ray Chest AP Portable (Preliminary result)  Result time 02/12/24 23:11:03      Wet Read by Santy Villatoro MD (02/12/24 23:11:03, [facility] - Emergency Dept, Emergency Medicine)    NAF                                     Medications   LORazepam injection 1 mg (1 mg Intravenous Not Given 2/12/24 2259)   ketorolac injection 30 mg (30 mg Intravenous Given 2/12/24 2259)     Medical Decision Making  Amount and/or Complexity of Data Reviewed  Labs: ordered. Decision-making details documented in ED Course.  Radiology: ordered and independent interpretation performed. Decision-making details documented in ED  Course.  ECG/medicine tests: ordered and independent interpretation performed. Decision-making details documented in ED Course.    Risk  Prescription drug management.  Parenteral controlled substances.    Differential includes:  anxiety, angina, atypical CP, chest wall pain, pleural effusion, GERD, PNA, PE, CHF, PTX.  Will obtain EKG, CXR, CBC, CMP, BNP, flu/COVID, troponin and give ketorolac for pain and lorazepam for anxiety.                                  Clinical Impression:  Final diagnoses:  [R07.9] Chest pain  [R07.89] Atypical chest pain (Primary)  [U07.1] COVID-19          ED Disposition Condition    Discharge Stable          ED Prescriptions    None       Follow-up Information       Follow up With Specialties Details Why Contact Info    Daniel Mann, DO Internal Medicine Schedule an appointment as soon as possible for a visit   [address] [facility] [address]  [phone]               Santy Villatoro MD  02/12/24 4257

## 2024-02-14 ENCOUNTER — PATIENT MESSAGE (OUTPATIENT)
Dept: RESEARCH | Facility: HOSPITAL | Age: 44
End: 2024-02-14
Payer: MEDICAID

## 2024-02-15 ENCOUNTER — HOSPITAL ENCOUNTER (EMERGENCY)
Facility: HOSPITAL | Age: 44
Discharge: HOME | End: 2024-02-15
Attending: INTERNAL MEDICINE
Payer: MEDICAID

## 2024-02-15 VITALS
RESPIRATION RATE: 23 BRPM | HEIGHT: 67 IN | SYSTOLIC BLOOD PRESSURE: 129 MMHG | HEART RATE: 51 BPM | OXYGEN SATURATION: 99 % | WEIGHT: 293 LBS | DIASTOLIC BLOOD PRESSURE: 61 MMHG | TEMPERATURE: 98 F | BODY MASS INDEX: 45.99 KG/M2

## 2024-02-15 DIAGNOSIS — R42 DIZZINESS: Primary | ICD-10-CM

## 2024-02-15 DIAGNOSIS — R05.9 COUGH: ICD-10-CM

## 2024-02-15 PROCEDURE — 99284 EMERGENCY DEPT VISIT MOD MDM: CPT | Mod: 25

## 2024-02-15 RX ORDER — MECLIZINE HYDROCHLORIDE 25 MG/1
50 TABLET ORAL 2 TIMES DAILY PRN
Qty: 60 TABLET | Refills: 0 | Status: SHIPPED | OUTPATIENT
Start: 2024-02-15 | End: 2024-06-06 | Stop reason: SDUPTHER

## 2024-02-16 NOTE — ED PROVIDER NOTES
Encounter Date: 2/15/2024  History from patient     History     Chief Complaint   Patient presents with    Dizziness     Pt states her head feels funny, unlike it felt for vertigo. Difficult to move head without feeling weird. No otc meds takesn     HPI    Larisa Marcus is 43 y.o. female who  has a past medical history of Benign paroxysmal vertigo, bilateral. arrives in ER with c/o Dizziness (Pt states her head feels funny, unlike it felt for vertigo. Difficult to move head without feeling weird. No otc meds takesn)      Review of patient's allergies indicates:  No Known Allergies  Past Medical History:   Diagnosis Date    Benign paroxysmal vertigo, bilateral      Past Surgical History:   Procedure Laterality Date    BILATERAL TUBAL LIGATION Bilateral      SECTION      x1     Family History   Problem Relation Age of Onset    Diabetes Mother     No Known Problems Father     Diabetes Sister     Diabetes Brother      Social History     Tobacco Use    Smoking status: Every Day     Average packs/day: 0.1 packs/day for 36.1 years (5.0 ttl pk-yrs)     Types: Cigarettes     Start date: 1998    Smokeless tobacco: Never   Substance Use Topics    Alcohol use: Not Currently    Drug use: Never     Review of Systems   Constitutional:  Negative for fever.   HENT:  Negative for trouble swallowing and voice change.    Eyes:  Negative for visual disturbance.   Respiratory:  Positive for cough. Negative for shortness of breath.    Cardiovascular:  Negative for chest pain.   Gastrointestinal:  Negative for abdominal pain, diarrhea and vomiting.   Genitourinary:  Negative for dysuria and hematuria.   Musculoskeletal:  Negative for back pain and gait problem.   Skin:  Negative for color change and rash.   Neurological:  Positive for dizziness. Negative for headaches.   Psychiatric/Behavioral:  Negative for behavioral problems and sleep disturbance.    All other systems reviewed and are negative.      Physical Exam      Initial Vitals [02/15/24 1900]   BP Pulse Resp Temp SpO2   121/84 73 18 97.9 °F (36.6 °C) 97 %      MAP       --         Physical Exam    Nursing note and vitals reviewed.  Constitutional: She appears well-developed and well-nourished. No distress.   Patient with high BMI   HENT:   Head: Atraumatic.   Eyes: EOM are normal. Pupils are equal, round, and reactive to light.   Neck: Neck supple.   Cardiovascular:  Normal rate and regular rhythm.           Pulmonary/Chest: Breath sounds normal. No respiratory distress.   Abdominal: Abdomen is soft. Bowel sounds are normal.   Musculoskeletal:         General: No tenderness or edema. Normal range of motion.      Cervical back: Neck supple.     Neurological: She is alert and oriented to person, place, and time. GCS score is 15. GCS eye subscore is 4. GCS verbal subscore is 5. GCS motor subscore is 6.   Skin: Skin is warm and dry.   Psychiatric: She has a normal mood and affect.         ED Course   Procedures  Orders Placed This Encounter   Procedures    CT Head Without Contrast    X-Ray Chest 1 View    Urinalysis, Reflex to Urine Culture    Airborne and Contact and Droplet Isolation Status     Medications - No data to display  No visits with results within 1 Day(s) from this visit.   Latest known visit with results is:   Admission on 02/12/2024, Discharged on 02/12/2024   Component Date Value Ref Range Status    QRS Duration 02/12/2024 92  ms Final    [facility] QTC Calculation 02/12/2024 467  ms Final    Natriuretic Peptide 02/12/2024 45.5  <=100.0 pg/mL Final    Sodium Level 02/12/2024 141  136 - 145 mmol/L Final    Potassium Level 02/12/2024 3.8  3.5 - 5.1 mmol/L Final    Chloride 02/12/2024 106  98 - 107 mmol/L Final    Carbon Dioxide 02/12/2024 26  22 - 29 mmol/L Final    Glucose Level 02/12/2024 127 (H)  74 - 100 mg/dL Final    Blood Urea Nitrogen 02/12/2024 7.0  7.0 - 18.7 mg/dL Final    Creatinine 02/12/2024 0.69  0.55 - 1.02 mg/dL Final    Calcium Level Total  02/12/2024 8.9  8.4 - 10.2 mg/dL Final    Protein Total 02/12/2024 7.0  6.4 - 8.3 gm/dL Final    Albumin Level 02/12/2024 3.2 (L)  3.5 - 5.0 g/dL Final    Globulin 02/12/2024 3.8 (H)  2.4 - 3.5 gm/dL Final    Albumin/Globulin Ratio 02/12/2024 0.8 (L)  1.1 - 2.0 ratio Final    Bilirubin Total 02/12/2024 0.2  <=1.5 mg/dL Final    Alkaline Phosphatase 02/12/2024 79  40 - 150 unit/L Final    Alanine Aminotransferase 02/12/2024 9  0 - 55 unit/L Final    Aspartate Aminotransferase 02/12/2024 13  5 - 34 unit/L Final    eGFR 02/12/2024 >60  mls/min/1.73/m2 Final    Influenza A PCR 02/12/2024 Not Detected  Not Detected Final    Influenza B PCR 02/12/2024 Not Detected  Not Detected Final    SARS-CoV-2 PCR 02/12/2024 Detected (A)  Not Detected, Negative Final    Troponin-I 02/12/2024 <0.010  0.000 - 0.045 ng/mL Final    WBC 02/12/2024 7.84  4.50 - 11.50 x10(3)/mcL Final    RBC 02/12/2024 5.09  4.20 - 5.40 x10(6)/mcL Final    Hgb 02/12/2024 13.5  12.0 - 16.0 g/dL Final    Hct 02/12/2024 42.8  37.0 - 47.0 % Final    MCV 02/12/2024 84.1  80.0 - 94.0 fL Final    MCH 02/12/2024 26.5 (L)  27.0 - 31.0 pg Final    MCHC 02/12/2024 31.5 (L)  33.0 - 36.0 g/dL Final    RDW 02/12/2024 13.8  11.5 - 17.0 % Final    Platelet 02/12/2024 364  130 - 400 x10(3)/mcL Final    MPV 02/12/2024 9.8  7.4 - 10.4 fL Final    Neut % 02/12/2024 44.5  % Final    Lymph % 02/12/2024 46.7  % Final    Mono % 02/12/2024 4.8  % Final    Eos % 02/12/2024 3.1  % Final    Basophil % 02/12/2024 0.4  % Final    Lymph # 02/12/2024 3.66  0.6 - 4.6 x10(3)/mcL Final    Neut # 02/12/2024 3.49  2.1 - 9.2 x10(3)/mcL Final    Mono # 02/12/2024 0.38  0.1 - 1.3 x10(3)/mcL Final    Eos # 02/12/2024 0.24  0 - 0.9 x10(3)/mcL Final    Baso # 02/12/2024 0.03  <=0.2 x10(3)/mcL Final    IG# 02/12/2024 0.04  0 - 0.04 x10(3)/mcL Final    IG% 02/12/2024 0.5  % Final       Labs Reviewed   URINALYSIS, REFLEX TO URINE CULTURE          Imaging Results              CT Head Without Contrast  (Final result)  Result time 02/15/24 20:46:07      Final result by Cornell Hernández MD (02/15/24 20:46:07)                   Impression:      No acute abnormality.      Electronically signed by: Cornell Hernández MD  Date:    02/15/2024  Time:    20:46               Narrative:    EXAMINATION:  CT HEAD WITHOUT CONTRAST    CLINICAL HISTORY:  Dizziness, persistent/recurrent, cardiac or vascular cause suspected;    TECHNIQUE:  Low dose axial CT images obtained throughout the head without intravenous contrast. Sagittal and coronal reconstructions were performed.  An automated dose exposure technique was utilized.  This limits radiation does the patient.    COMPARISON:  None.    FINDINGS:  Intracranial compartment:    Ventricles and sulci are normal in size for age without evidence of hydrocephalus. No extra-axial blood or fluid collections.    The brain parenchyma appears normal. No parenchymal mass, hemorrhage, edema or major vascular distribution infarct.    Skull/extracranial contents (limited evaluation): No fracture. Mastoid air cells and paranasal sinuses are essentially clear.                                       X-Ray Chest 1 View (Preliminary result)  Result time 02/15/24 20:51:11      Wet Read by Brice Jose MD (02/15/24 20:51:11, [facility] - Emergency Dept, Emergency Medicine)    Chest One View:  Independently reviewed and/or interpreted by Brice Jose MD.  No Focal Consolidation, No Acute Cardiopulmonary abnormality identified grossly.                                     Medications - No data to display  Medical Decision Making    Larisa Marcus is 43 y.o. female who  has a past medical history of Benign paroxysmal vertigo, bilateral. arrives in ER with c/o Dizziness (Pt states her head feels funny, unlike it felt for vertigo. Difficult to move head without feeling weird. No otc meds takesn)    Patient who has been having dizziness for some months, comes to the emergency room with complaint  of dizziness and feeling that everything is moving in the head, she says she was told that she has vertigo, she went to the ENT, they told her that it does not look like vertigo and she needs to go to the neurologist but they have never sent him to the neurologist, then she was also supposed to go and see a cardiologist because her heart does not pump enough blood.  But she has not seen a cardiologist either.    Patient was diagnosed with COVID-19 on 12th, today when I am examining patient she has a oxygen saturation of 100% on room air with a face mask on, heart rate is in 60s, blood pressure is stable in normal range also, she has not having symptoms at this time but she says when she moves her head it gets worse.  She has meclizine at home but she has not taken it,    I advised her that I am going to do a CT scan of the head and a chest x-ray on her as such her workup was pretty much okay just couple of days back.  She had a normal hemoglobin and hematocrit, normal electrolytes, normal kidney function and liver function, and since she has the same symptoms for a long time I do not think that I will find anything on the blood work.  She was just concerned about her head moving, she says it feels like her brain is moving in whichever direction she turns her head to, so decided to do a CT scan of the head.    Amount and/or Complexity of Data Reviewed  Radiology: ordered and independent interpretation performed. Decision-making details documented in ED Course.               ED Course as of 02/15/24 2157   u Feb 15, 2024   2051 Patient's CT head is again negative, chest x-ray does not show any major infiltrates, her oxygen saturation is 100% on room air with a face mask on, I will let her drink water check a urine and will give her a dose of meclizine, [GQ]   [id number] I will advise her to keep taking meclizine and see her family doctor for further workup.  She is supposed to see neurologist and cardiologist for her  vertigo which has been going on for months now. [GQ]      ED Course User Index  [GQ] Brice Jose MD                           Clinical Impression:  Final diagnoses:  [R05.9] Cough  [R42] Dizziness (Primary)          ED Disposition Condition    Discharge Stable          ED Prescriptions       Medication Sig Dispense Start Date End Date Auth. Provider    meclizine (ANTIVERT) 25 mg tablet Take 2 tablets (50 mg total) by mouth 2 (two) times daily as needed for Dizziness. 60 tablet 2/15/2024 -- Brice Jose MD          Follow-up Information       Follow up With Specialties Details Why Contact Info    PMD  In 2 days               Brice Jose MD  02/15/24 3014

## 2024-04-09 ENCOUNTER — HOSPITAL ENCOUNTER (EMERGENCY)
Facility: HOSPITAL | Age: 44
Discharge: HOME | End: 2024-04-09
Attending: INTERNAL MEDICINE
Payer: MEDICAID

## 2024-04-09 VITALS
WEIGHT: 293 LBS | TEMPERATURE: 99 F | BODY MASS INDEX: 45.99 KG/M2 | RESPIRATION RATE: 24 BRPM | OXYGEN SATURATION: 97 % | SYSTOLIC BLOOD PRESSURE: 125 MMHG | HEART RATE: 110 BPM | HEIGHT: 67 IN | DIASTOLIC BLOOD PRESSURE: 78 MMHG

## 2024-04-09 DIAGNOSIS — R42 DIZZINESS: ICD-10-CM

## 2024-04-09 DIAGNOSIS — F41.9 ANXIETY: Primary | ICD-10-CM

## 2024-04-09 LAB
ALBUMIN SERPL-MCNC: 3.4 G/DL (ref 3.5–5)
ALBUMIN/GLOB SERPL: 0.9 RATIO (ref 1.1–2)
ALP SERPL-CCNC: 94 UNIT/L (ref 40–150)
ALT SERPL-CCNC: 8 UNIT/L (ref 0–55)
AMPHET UR QL SCN: NEGATIVE
APPEARANCE UR: CLEAR
AST SERPL-CCNC: 14 UNIT/L (ref 5–34)
B-HCG SERPL QL: NEGATIVE
BACTERIA #/AREA URNS AUTO: NORMAL /HPF
BARBITURATE SCN PRESENT UR: NEGATIVE
BASOPHILS # BLD AUTO: 0.06 X10(3)/MCL
BASOPHILS NFR BLD AUTO: 0.6 %
BENZODIAZ UR QL SCN: NEGATIVE
BILIRUB SERPL-MCNC: 0.3 MG/DL
BILIRUB UR QL STRIP.AUTO: NEGATIVE
BNP BLD-MCNC: 40 PG/ML
BUN SERPL-MCNC: 12 MG/DL (ref 7–18.7)
CALCIUM SERPL-MCNC: 9.3 MG/DL (ref 8.4–10.2)
CANNABINOIDS UR QL SCN: NEGATIVE
CHLORIDE SERPL-SCNC: 108 MMOL/L (ref 98–107)
CO2 SERPL-SCNC: 26 MMOL/L (ref 22–29)
COCAINE UR QL SCN: NEGATIVE
COLOR UR AUTO: YELLOW
CREAT SERPL-MCNC: 0.81 MG/DL (ref 0.55–1.02)
EOSINOPHIL # BLD AUTO: 0.56 X10(3)/MCL (ref 0–0.9)
EOSINOPHIL NFR BLD AUTO: 5.6 %
ERYTHROCYTE [DISTWIDTH] IN BLOOD BY AUTOMATED COUNT: 14.3 % (ref 11.5–17)
ETHANOL SERPL-MCNC: <10 MG/DL
FENTANYL UR QL SCN: NEGATIVE
GFR SERPLBLD CREATININE-BSD FMLA CKD-EPI: >60 MLS/MIN/1.73/M2
GLOBULIN SER-MCNC: 3.9 GM/DL (ref 2.4–3.5)
GLUCOSE SERPL-MCNC: 113 MG/DL (ref 74–100)
GLUCOSE UR QL STRIP.AUTO: NEGATIVE
HCT VFR BLD AUTO: 43.5 % (ref 37–47)
HGB BLD-MCNC: 13.5 G/DL (ref 12–16)
IMM GRANULOCYTES # BLD AUTO: 0.03 X10(3)/MCL (ref 0–0.04)
IMM GRANULOCYTES NFR BLD AUTO: 0.3 %
KETONES UR QL STRIP.AUTO: NEGATIVE
LACTATE SERPL-SCNC: 1.1 MMOL/L (ref 0.5–2.2)
LEUKOCYTE ESTERASE UR QL STRIP.AUTO: NEGATIVE
LYMPHOCYTES # BLD AUTO: 4.55 X10(3)/MCL (ref 0.6–4.6)
LYMPHOCYTES NFR BLD AUTO: 45.6 %
MAGNESIUM SERPL-MCNC: 1.9 MG/DL (ref 1.6–2.6)
MCH RBC QN AUTO: 26.3 PG (ref 27–31)
MCHC RBC AUTO-ENTMCNC: 31 G/DL (ref 33–36)
MCV RBC AUTO: 84.6 FL (ref 80–94)
MDMA UR QL SCN: NEGATIVE
MONOCYTES # BLD AUTO: 0.49 X10(3)/MCL (ref 0.1–1.3)
MONOCYTES NFR BLD AUTO: 4.9 %
NEUTROPHILS # BLD AUTO: 4.29 X10(3)/MCL (ref 2.1–9.2)
NEUTROPHILS NFR BLD AUTO: 43 %
NITRITE UR QL STRIP.AUTO: NEGATIVE
OPIATES UR QL SCN: NEGATIVE
PCP UR QL: NEGATIVE
PH UR STRIP.AUTO: 6 [PH]
PH UR: 6 [PH] (ref 3–11)
PLATELET # BLD AUTO: 347 X10(3)/MCL (ref 130–400)
PMV BLD AUTO: 10.1 FL (ref 7.4–10.4)
POTASSIUM SERPL-SCNC: 4.1 MMOL/L (ref 3.5–5.1)
PROT SERPL-MCNC: 7.3 GM/DL (ref 6.4–8.3)
PROT UR QL STRIP.AUTO: NEGATIVE
RBC # BLD AUTO: 5.14 X10(6)/MCL (ref 4.2–5.4)
RBC #/AREA URNS AUTO: NORMAL /HPF
RBC UR QL AUTO: ABNORMAL
SODIUM SERPL-SCNC: 141 MMOL/L (ref 136–145)
SP GR UR STRIP.AUTO: 1.01 (ref 1–1.03)
SPECIFIC GRAVITY, URINE AUTO (.000) (OHS): 1.01 (ref 1–1.03)
SQUAMOUS #/AREA URNS AUTO: NORMAL /HPF
TROPONIN I SERPL-MCNC: <0.01 NG/ML (ref 0–0.04)
TSH SERPL-ACNC: 2.3 UIU/ML (ref 0.35–4.94)
UROBILINOGEN UR STRIP-ACNC: 0.2
WBC # SPEC AUTO: 9.98 X10(3)/MCL (ref 4.5–11.5)
WBC #/AREA URNS AUTO: NORMAL /HPF

## 2024-04-09 PROCEDURE — 84484 ASSAY OF TROPONIN QUANT: CPT | Performed by: INTERNAL MEDICINE

## 2024-04-09 PROCEDURE — 81025 URINE PREGNANCY TEST: CPT | Performed by: INTERNAL MEDICINE

## 2024-04-09 PROCEDURE — 84443 ASSAY THYROID STIM HORMONE: CPT | Performed by: INTERNAL MEDICINE

## 2024-04-09 PROCEDURE — 80307 DRUG TEST PRSMV CHEM ANLYZR: CPT | Performed by: INTERNAL MEDICINE

## 2024-04-09 PROCEDURE — 82077 ASSAY SPEC XCP UR&BREATH IA: CPT | Performed by: INTERNAL MEDICINE

## 2024-04-09 PROCEDURE — 83735 ASSAY OF MAGNESIUM: CPT | Performed by: INTERNAL MEDICINE

## 2024-04-09 PROCEDURE — 83880 ASSAY OF NATRIURETIC PEPTIDE: CPT | Performed by: INTERNAL MEDICINE

## 2024-04-09 PROCEDURE — 80053 COMPREHEN METABOLIC PANEL: CPT | Performed by: INTERNAL MEDICINE

## 2024-04-09 PROCEDURE — 25000003 PHARM REV CODE 250: Performed by: INTERNAL MEDICINE

## 2024-04-09 PROCEDURE — 99283 EMERGENCY DEPT VISIT LOW MDM: CPT

## 2024-04-09 PROCEDURE — 83605 ASSAY OF LACTIC ACID: CPT | Performed by: INTERNAL MEDICINE

## 2024-04-09 PROCEDURE — 81001 URINALYSIS AUTO W/SCOPE: CPT | Performed by: INTERNAL MEDICINE

## 2024-04-09 PROCEDURE — 85025 COMPLETE CBC W/AUTO DIFF WBC: CPT | Performed by: INTERNAL MEDICINE

## 2024-04-09 RX ORDER — PROPRANOLOL HYDROCHLORIDE 40 MG/1
40 TABLET ORAL
Status: COMPLETED | OUTPATIENT
Start: 2024-04-09 | End: 2024-04-09

## 2024-04-09 RX ORDER — MECLIZINE HCL 12.5 MG 12.5 MG/1
25 TABLET ORAL
Status: COMPLETED | OUTPATIENT
Start: 2024-04-09 | End: 2024-04-09

## 2024-04-09 RX ADMIN — PROPRANOLOL HYDROCHLORIDE 40 MG: 40 TABLET ORAL at 05:04

## 2024-04-09 RX ADMIN — MECLIZINE HCL 12.5 MG 25 MG: 12.5 TABLET ORAL at 05:04

## 2024-04-09 NOTE — Clinical Note
"Larisa Robleslux"Amrit was seen and treated in our emergency department on 4/9/2024.  She may return to work on 04/10/2024.       If you have any questions or concerns, please don't hesitate to call.      Rossy Bryant LPN"

## 2024-04-09 NOTE — ED PROVIDER NOTES
Encounter Date: 2024       History     Chief Complaint   Patient presents with    Dizziness     When attempting to go to sleep last night she became dizzy and hot. Took her last meclizine yesterday. History of vertigo     43-year-old black female presents with dizziness and hot flashes.  She states that she has dizziness and occasionally gets these flushing episodes she was told she has anxiety and she switch into somewhere in [place] today at the [facility] to get referred to Neurology because she keeps having these dizzy and other attacks.  When asked her about anxiety she says she does have quite a bit of anxiety and she is nervous about taking too many meds or doing anything to treat it.  She has been worked up several times in this emergency department for this in his actually seen ear nose and throat was still continues to have episodes      Review of patient's allergies indicates:  No Known Allergies  Past Medical History:   Diagnosis Date    Benign paroxysmal vertigo, bilateral      Past Surgical History:   Procedure Laterality Date    BILATERAL TUBAL LIGATION Bilateral      SECTION      x1     Family History   Problem Relation Age of Onset    Diabetes Mother     No Known Problems Father     Diabetes Sister     Diabetes Brother      Social History     Tobacco Use    Smoking status: Every Day     Average packs/day: 0.1 packs/day for 36.3 years (5.0 ttl pk-yrs)     Types: Cigarettes     Start date: 1998    Smokeless tobacco: Never   Substance Use Topics    Alcohol use: Not Currently    Drug use: Never     Review of Systems   Constitutional: Negative.  Negative for activity change, appetite change, chills, diaphoresis, fatigue, fever and unexpected weight change.   HENT: Negative.  Negative for congestion, dental problem, drooling, ear discharge, ear pain, facial swelling, hearing loss, mouth sores, nosebleeds, postnasal drip, rhinorrhea, sinus pressure, sinus pain, sneezing, sore throat,  tinnitus, trouble swallowing and voice change.    Eyes: Negative.  Negative for photophobia, pain, discharge, redness, itching and visual disturbance.   Respiratory: Negative.  Negative for apnea, cough, choking, chest tightness, shortness of breath, wheezing and stridor.    Cardiovascular: Negative.  Negative for chest pain, palpitations and leg swelling.   Gastrointestinal: Negative.  Negative for abdominal distention, abdominal pain, anal bleeding, blood in stool, constipation, diarrhea, nausea, rectal pain and vomiting.   Endocrine: Negative.  Negative for cold intolerance, heat intolerance, polydipsia, polyphagia and polyuria.   Genitourinary: Negative.  Negative for decreased urine volume, difficulty urinating, dyspareunia, dysuria, enuresis, flank pain, frequency, genital sores, hematuria, menstrual problem, pelvic pain, urgency, vaginal bleeding, vaginal discharge and vaginal pain.   Musculoskeletal: Negative.  Negative for arthralgias, back pain, gait problem, joint swelling, myalgias, neck pain and neck stiffness.   Skin: Negative.  Negative for color change, pallor, rash and wound.   Allergic/Immunologic: Negative.  Negative for environmental allergies, food allergies and immunocompromised state.   Neurological:  Positive for dizziness. Negative for tremors, seizures, syncope, facial asymmetry, speech difficulty, weakness, light-headedness, numbness and headaches.   Hematological: Negative.  Negative for adenopathy. Does not bruise/bleed easily.   Psychiatric/Behavioral: Negative.  Negative for agitation, behavioral problems, confusion, decreased concentration, dysphoric mood, hallucinations, self-injury, sleep disturbance and suicidal ideas. The patient is not nervous/anxious and is not hyperactive.    All other systems reviewed and are negative.      Physical Exam     Initial Vitals [04/09/24 0433]   BP Pulse Resp Temp SpO2   130/89 107 18 98.4 °F (36.9 °C) 96 %      MAP       --         Physical  Exam    Nursing note and vitals reviewed.  Constitutional: She appears well-developed and well-nourished. She is not diaphoretic. No distress.   Morbid obesity   HENT:   Head: Normocephalic and atraumatic.   Mouth/Throat: Oropharynx is clear and moist. No oropharyngeal exudate.   Eyes: Conjunctivae and EOM are normal. Pupils are equal, round, and reactive to light. No scleral icterus.   Neck: Neck supple. No JVD present.   Normal range of motion.  Cardiovascular:  Normal rate, regular rhythm, normal heart sounds and intact distal pulses.     Exam reveals no gallop and no friction rub.       No murmur heard.  Pulmonary/Chest: Breath sounds normal. No respiratory distress. She has no wheezes. She exhibits no tenderness.   Abdominal: Abdomen is soft. Bowel sounds are normal. She exhibits no distension. There is no abdominal tenderness. There is no rebound.   Musculoskeletal:         General: Normal range of motion.      Cervical back: Normal range of motion and neck supple.     Neurological: She is alert and oriented to person, place, and time. She has normal strength.   Skin: Skin is warm and dry. Capillary refill takes less than 2 seconds.   Psychiatric: Her speech is normal and behavior is normal. Judgment and thought content normal. Her mood appears anxious. Cognition and memory are normal.         ED Course   Procedures    Admission on 04/09/2024   Component Date Value Ref Range Status    Sodium Level 04/09/2024 141  136 - 145 mmol/L Final    Potassium Level 04/09/2024 4.1  3.5 - 5.1 mmol/L Final    Chloride 04/09/2024 108 (H)  98 - 107 mmol/L Final    Carbon Dioxide 04/09/2024 26  22 - 29 mmol/L Final    Glucose Level 04/09/2024 113 (H)  74 - 100 mg/dL Final    Blood Urea Nitrogen 04/09/2024 12.0  7.0 - 18.7 mg/dL Final    Creatinine 04/09/2024 0.81  0.55 - 1.02 mg/dL Final    Calcium Level Total 04/09/2024 9.3  8.4 - 10.2 mg/dL Final    Protein Total 04/09/2024 7.3  6.4 - 8.3 gm/dL Final    Albumin Level  04/09/2024 3.4 (L)  3.5 - 5.0 g/dL Final    Globulin 04/09/2024 3.9 (H)  2.4 - 3.5 gm/dL Final    Albumin/Globulin Ratio 04/09/2024 0.9 (L)  1.1 - 2.0 ratio Final    Bilirubin Total 04/09/2024 0.3  <=1.5 mg/dL Final    Alkaline Phosphatase 04/09/2024 94  40 - 150 unit/L Final    Alanine Aminotransferase 04/09/2024 8  0 - 55 unit/L Final    Aspartate Aminotransferase 04/09/2024 14  5 - 34 unit/L Final    eGFR 04/09/2024 >60  mls/min/1.73/m2 Final    Color, UA 04/09/2024 Yellow  Yellow, Light-Yellow, Dark Yellow, Rosario, Straw Final    Appearance, UA 04/09/2024 Clear  Clear Final    Specific Gravity, UA 04/09/2024 1.015  1.005 - 1.030 Final    pH, UA 04/09/2024 6.0  5.0 - 8.5 Final    Protein, UA 04/09/2024 Negative  Negative Final    Glucose, UA 04/09/2024 Negative  Negative, Normal Final    Ketones, UA 04/09/2024 Negative  Negative Final    Blood, UA 04/09/2024 3+ (A)  Negative Final    Bilirubin, UA 04/09/2024 Negative  Negative Final    Urobilinogen, UA 04/09/2024 0.2  0.2, 1.0, Normal Final    Nitrites, UA 04/09/2024 Negative  Negative Final    Leukocyte Esterase, UA 04/09/2024 Negative  Negative Final    Beta hCG Qualitative, Urine 04/09/2024 Negative  Negative Final    Ethanol Level 04/09/2024 <10.0  <=10.0 mg/dL Final    Amphetamines, Urine 04/09/2024 Negative  Negative Final    Barbituates, Urine 04/09/2024 Negative  Negative Final    Benzodiazepine, Urine 04/09/2024 Negative  Negative Final    Cannabinoids, Urine 04/09/2024 Negative  Negative Final    Cocaine, Urine 04/09/2024 Negative  Negative Final    Fentanyl, Urine 04/09/2024 Negative  Negative Final    MDMA, Urine 04/09/2024 Negative  Negative Final    Opiates, Urine 04/09/2024 Negative  Negative Final    Phencyclidine, Urine 04/09/2024 Negative  Negative Final    pH, Urine 04/09/2024 6.0  3.0 - 11.0 Final    Specific Gravity, Urine Auto 04/09/2024 1.015  1.001 - 1.035 Final    Lactic Acid Level 04/09/2024 1.1  0.5 - 2.2 mmol/L Final    TSH 04/09/2024  2.300  0.350 - 4.940 uIU/mL Final    Troponin-I 04/09/2024 <0.010  0.000 - 0.045 ng/mL Final    Natriuretic Peptide 04/09/2024 40.0  <=100.0 pg/mL Final    Magnesium Level 04/09/2024 1.90  1.60 - 2.60 mg/dL Final    WBC 04/09/2024 9.98  4.50 - 11.50 x10(3)/mcL Final    RBC 04/09/2024 5.14  4.20 - 5.40 x10(6)/mcL Final    Hgb 04/09/2024 13.5  12.0 - 16.0 g/dL Final    Hct 04/09/2024 43.5  37.0 - 47.0 % Final    MCV 04/09/2024 84.6  80.0 - 94.0 fL Final    MCH 04/09/2024 26.3 (L)  27.0 - 31.0 pg Final    MCHC 04/09/2024 31.0 (L)  33.0 - 36.0 g/dL Final    RDW 04/09/2024 14.3  11.5 - 17.0 % Final    Platelet 04/09/2024 347  130 - 400 x10(3)/mcL Final    MPV 04/09/2024 10.1  7.4 - 10.4 fL Final    Neut % 04/09/2024 43.0  % Final    Lymph % 04/09/2024 45.6  % Final    Mono % 04/09/2024 4.9  % Final    Eos % 04/09/2024 5.6  % Final    Basophil % 04/09/2024 0.6  % Final    Lymph # 04/09/2024 4.55  0.6 - 4.6 x10(3)/mcL Final    Neut # 04/09/2024 4.29  2.1 - 9.2 x10(3)/mcL Final    Mono # 04/09/2024 0.49  0.1 - 1.3 x10(3)/mcL Final    Eos # 04/09/2024 0.56  0 - 0.9 x10(3)/mcL Final    Baso # 04/09/2024 0.06  <=0.2 x10(3)/mcL Final    IG# 04/09/2024 0.03  0 - 0.04 x10(3)/mcL Final    IG% 04/09/2024 0.3  % Final    Bacteria, UA 04/09/2024 None Seen  None Seen, Rare, Occasional /HPF Final    RBC, UA 04/09/2024 0-5  None Seen, 0-2, 3-5, 0-5 /HPF Final    WBC, UA 04/09/2024 None Seen  None Seen, 0-2, 3-5, 0-5 /HPF Final    Squamous Epithelial Cells, UA 04/09/2024 Occasional  None Seen, Rare, Occasional, Occ /HPF Final       Labs Reviewed   COMPREHENSIVE METABOLIC PANEL - Abnormal; Notable for the following components:       Result Value    Chloride 108 (*)     Glucose Level 113 (*)     Albumin Level 3.4 (*)     Globulin 3.9 (*)     Albumin/Globulin Ratio 0.9 (*)     All other components within normal limits   URINALYSIS, REFLEX TO URINE CULTURE - Abnormal; Notable for the following components:    Blood, UA 3+ (*)     All other  components within normal limits   CBC WITH DIFFERENTIAL - Abnormal; Notable for the following components:    MCH 26.3 (*)     MCHC 31.0 (*)     All other components within normal limits   PREGNANCY TEST, URINE RAPID - Normal   ALCOHOL,MEDICAL (ETHANOL) - Normal   DRUG SCREEN, URINE (BEAKER) - Normal    Narrative:     Cut off concentrations:    Amphetamines - 1000 ng/ml  Barbiturates - 200 ng/ml  Benzodiazepine - 200 ng/ml  Cannabinoids (THC) - 50 ng/ml  Cocaine - 300 ng/ml  Fentanyl - 1.0 ng/ml  MDMA - 500 ng/ml  Opiates - 300 ng/ml   Phencyclidine (PCP) - 25 ng/ml    Specimen submitted for drug analysis and tested for pH and specific gravity in order to evaluate sample integrity. Suspect tampering if specific gravity is <1.003 and/or pH is not within the range of 4.5 - 8.0  False negatives may result form substances such as bleach added to urine.  False positives may result for the presence of a substance with similar chemical structure to the drug or its metabolite.    This test provides only a PRELIMINARY analytical test result. A more specific alternate chemical method must be used in order to obtain a confirmed analytical result. Gas chromatography/mass spectrometry (GC/MS) is the preferred confirmatory method. Other chemical confirmation methods are available. Clinical consideration and professional judgement should be applied to any drug of abuse test result, particularly when preliminary positive results are used.    Positive results will be confirmed only at the physicians request. Unconfirmed screening results are to be used only for medical purposes (treatment).        LACTIC ACID, PLASMA - Normal   TSH - Normal   TROPONIN I - Normal   B-TYPE NATRIURETIC PEPTIDE - Normal   MAGNESIUM - Normal   URINALYSIS, MICROSCOPIC - Normal   CBC W/ AUTO DIFFERENTIAL    Narrative:     The following orders were created for panel order CBC auto differential.  Procedure                               Abnormality          Status                     ---------                               -----------         ------                     CBC with Differential[[id number]]       Abnormal            Final result                 Please view results for these tests on the individual orders.          Imaging Results    None          Medications   meclizine tablet 25 mg (25 mg Oral Given 4/9/24 0516)   propranoloL tablet 40 mg (40 mg Oral Given 4/9/24 0539)     Medical Decision Making  43-year-old obese black female presents with dizziness.  Differential diagnosis include vertigo, labyrinthitis, panic anxiety, electrolyte abnormalities, cerebrovascular accident, blood pressure issues.  Medication induced issues.  Patient had workup which included blood work and I extensive chart review she has been seen numerous times for this.  She was given meclizine and this did help her dizziness a little bit but she continued to have anxiety and mild elevated pulse so I gave her a propranolol which will treat her anxiety until she can get to her primary care appointment which is at 9:00 a.m. this morning    Problems Addressed:  Anxiety: chronic illness or injury  Dizziness: chronic illness or injury    Amount and/or Complexity of Data Reviewed  Independent Historian: spouse  External Data Reviewed: labs, radiology and notes.  Labs: ordered. Decision-making details documented in ED Course.    Risk  OTC drugs.  Prescription drug management.  Diagnosis or treatment significantly limited by social determinants of health.                                      Clinical Impression:  Final diagnoses:  [F41.9] Anxiety (Primary)  [R42] Dizziness          ED Disposition Condition    Discharge Stable          ED Prescriptions    None       Follow-up Information       Follow up With Specialties Details Why Contact Info    Primary care physician  Call today            Ila Ernandez is a certified MA and was present during the entire interaction with this patient       Norm Parrish MD  04/09/24 0528

## 2024-05-08 ENCOUNTER — HOSPITAL ENCOUNTER (EMERGENCY)
Facility: HOSPITAL | Age: 44
Discharge: HOME | End: 2024-05-08
Attending: STUDENT IN AN ORGANIZED HEALTH CARE EDUCATION/TRAINING PROGRAM
Payer: MEDICAID

## 2024-05-08 VITALS
BODY MASS INDEX: 45.99 KG/M2 | HEART RATE: 57 BPM | RESPIRATION RATE: 20 BRPM | OXYGEN SATURATION: 96 % | WEIGHT: 293 LBS | SYSTOLIC BLOOD PRESSURE: 157 MMHG | TEMPERATURE: 98 F | DIASTOLIC BLOOD PRESSURE: 102 MMHG | HEIGHT: 67 IN

## 2024-05-08 DIAGNOSIS — H81.13 BENIGN PAROXYSMAL POSITIONAL VERTIGO DUE TO BILATERAL VESTIBULAR DISORDER: Primary | ICD-10-CM

## 2024-05-08 PROCEDURE — 99281 EMR DPT VST MAYX REQ PHY/QHP: CPT

## 2024-05-08 NOTE — ED PROVIDER NOTES
Encounter Date: 2024       History     Chief Complaint   Patient presents with    Dizziness     Pt states she has had dizziness for about a year. Pt states she is being treated with meclizine for the last few months but medicine doesn't seem to be working anymore.     HPI    43-year-old female with a past medical history of morbid obesity and chronic BPPV presents emergency department for dizziness.  Patient states she has been having dizziness for about a year.  States she has been taking meclizine as needed.  She recently started Flonase which helped but today she had or worsening case of dizziness.  No current dizziness at this time.  States she wants to seen ENT.  Does not want any blood work or further workup.  States she had a CT in the past.  Able to walk with no difficulty.    Review of patient's allergies indicates:  No Known Allergies  Past Medical History:   Diagnosis Date    Benign paroxysmal vertigo, bilateral      Past Surgical History:   Procedure Laterality Date    BILATERAL TUBAL LIGATION Bilateral      SECTION      x1     Family History   Problem Relation Name Age of Onset    Diabetes Mother      No Known Problems Father      Diabetes Sister      Diabetes Brother       Social History     Tobacco Use    Smoking status: Every Day     Average packs/day: 0.5 packs/day for 10.0 years (5.0 ttl pk-yrs)     Types: Cigarettes     Start date: 1998    Smokeless tobacco: Never   Substance Use Topics    Alcohol use: Not Currently    Drug use: Never     Review of Systems   Constitutional:  Negative for fever.   HENT:  Negative for sore throat.    Respiratory:  Negative for cough and shortness of breath.    Cardiovascular:  Negative for chest pain.   Gastrointestinal:  Negative for abdominal pain, constipation, diarrhea, nausea and vomiting.   Genitourinary:  Negative for dysuria.   Musculoskeletal:  Negative for back pain.   Skin:  Negative for rash.   Neurological:  Positive for dizziness.  Negative for weakness and headaches.   Hematological:  Does not bruise/bleed easily.   All other systems reviewed and are negative.      Physical Exam     Initial Vitals [05/08/24 0100]   BP Pulse Resp Temp SpO2   (!) 157/102 (!) 57 20 97.6 °F (36.4 °C) 96 %      MAP       --         Physical Exam    Nursing note and vitals reviewed.  Constitutional: She appears well-developed and well-nourished. No distress.   Morbidly obese   Cardiovascular:  Normal rate and regular rhythm.           Pulmonary/Chest: Breath sounds normal. No respiratory distress. She has no wheezes. She has no rhonchi. She has no rales.   Abdominal: Abdomen is soft. There is no abdominal tenderness. There is no rebound and no guarding.   Musculoskeletal:         General: No tenderness. Normal range of motion.      Comments: Mental status   Alert and attentive   Speech clear and fluent with normal comprehension   Able to provide clear account of historical and recent events   Oriented to person place and time   Able to follow 3 commands sequence (right thumb, touch left ear, stick out tongue)    Cranial nerves   2. Pupils equal round reactive to light bilaterally, right eye:  No visual defects, 4 quadrant my right finger confirmation.  Left eye:  No visual defects, 4 quadrant by finger confirmation   3. Bilateral eye adduction and elevation without diplopia    4. Bilateral eye adduction and depression without diplopia   6. Bilateral eye abduction with diplopia  5. Masseter muscle normal bulk and jaw opens symmetrically   7. Face symmetric during speech.  Wrinkle forehead and smile symmetrically intact, bilaterally   8.  Response to verbal stimulus   9. Normal speech without breathlessness or hoarseness   10. Normal speech without breathlessness or hoarseness  12. Tongue protrudes midline    Motor exam   Upper extremities: No pronator drift   Lower extremities:  Bilateral leg lift off bed to resistance, symmetric 5/5   Coordination:  No nystagmus,  no saccadic pursuit on eye range of motion    Gait:  Normal gait (standing, toe to heal, and tandem)  Sensation: Deferred       Neurological: She is alert and oriented to person, place, and time. She has normal strength.   Skin: Skin is warm. Capillary refill takes less than 2 seconds.         ED Course   Procedures  Labs Reviewed - No data to display       Imaging Results    None          Medications - No data to display  Medical Decision Making  differential diagnosis  Vertigo, inner ear abnormality, dehydration,  as well as multiple other possible etiologies    Patient presents emergency department for chronic dizziness.  Took meclizine and now dizziness has resolved.  Multiple workups in the past for this.  Patient not wanting anything done just wants ENT referral.  Will do so.  Patient understands risk of misdiagnosis.  Return precautions given    Problems Addressed:  Benign paroxysmal positional vertigo due to bilateral vestibular disorder: chronic illness or injury    Amount and/or Complexity of Data Reviewed  External Data Reviewed: labs, radiology and notes.                                      Clinical Impression:  Final diagnoses:  [H81.13] Benign paroxysmal positional vertigo due to bilateral vestibular disorder (Primary)          ED Disposition Condition    Discharge Stable          ED Prescriptions    None       Follow-up Information       Follow up With Specialties Details Why Contact Info    [facility] - Emergency Dept Emergency Medicine Go to  If symptoms worsen [address]  [facility] [address]  [phone]    Referral sent to ENT                 Antonio Babb MD  05/08/24 0113

## 2024-05-25 ENCOUNTER — HOSPITAL ENCOUNTER (OUTPATIENT)
Facility: HOSPITAL | Age: 44
Discharge: HOME | End: 2024-05-26
Attending: EMERGENCY MEDICINE | Admitting: EMERGENCY MEDICINE
Payer: MEDICAID

## 2024-05-25 DIAGNOSIS — R07.9 CHEST PAIN: ICD-10-CM

## 2024-05-25 DIAGNOSIS — R06.00 DYSPNEA, UNSPECIFIED TYPE: ICD-10-CM

## 2024-05-25 DIAGNOSIS — I50.9 CHF (CONGESTIVE HEART FAILURE): ICD-10-CM

## 2024-05-25 DIAGNOSIS — I47.10 SVT (SUPRAVENTRICULAR TACHYCARDIA): ICD-10-CM

## 2024-05-25 DIAGNOSIS — I49.8 ACCELERATED JUNCTIONAL RHYTHM: Primary | ICD-10-CM

## 2024-05-25 LAB
ALBUMIN SERPL-MCNC: 3.4 G/DL (ref 3.5–5)
ALBUMIN/GLOB SERPL: 0.9 RATIO (ref 1.1–2)
ALP SERPL-CCNC: 91 UNIT/L (ref 40–150)
ALT SERPL-CCNC: 11 UNIT/L (ref 0–55)
ANION GAP SERPL CALC-SCNC: 8 MEQ/L
APTT PPP: 32.8 SECONDS (ref 24.6–37.2)
AST SERPL-CCNC: 17 UNIT/L (ref 5–34)
BACTERIA #/AREA URNS AUTO: NORMAL /HPF
BASOPHILS # BLD AUTO: 0.08 X10(3)/MCL
BASOPHILS NFR BLD AUTO: 1 %
BILIRUB SERPL-MCNC: 0.2 MG/DL
BILIRUB UR QL STRIP.AUTO: NEGATIVE
BNP BLD-MCNC: 19.1 PG/ML
BUN SERPL-MCNC: 10 MG/DL (ref 7–18.7)
CALCIUM SERPL-MCNC: 9 MG/DL (ref 8.4–10.2)
CHLORIDE SERPL-SCNC: 109 MMOL/L (ref 98–107)
CLARITY UR: CLEAR
CO2 SERPL-SCNC: 24 MMOL/L (ref 22–29)
COLOR UR AUTO: YELLOW
CREAT SERPL-MCNC: 0.73 MG/DL (ref 0.55–1.02)
CREAT/UREA NIT SERPL: 14
EOSINOPHIL # BLD AUTO: 0.4 X10(3)/MCL (ref 0–0.9)
EOSINOPHIL NFR BLD AUTO: 5.2 %
ERYTHROCYTE [DISTWIDTH] IN BLOOD BY AUTOMATED COUNT: 14.2 % (ref 11.5–17)
FLUAV AG UPPER RESP QL IA.RAPID: NOT DETECTED
FLUBV AG UPPER RESP QL IA.RAPID: NOT DETECTED
GFR SERPLBLD CREATININE-BSD FMLA CKD-EPI: >60 ML/MIN/1.73/M2
GLOBULIN SER-MCNC: 3.9 GM/DL (ref 2.4–3.5)
GLUCOSE SERPL-MCNC: 106 MG/DL (ref 74–100)
GLUCOSE UR QL STRIP: NEGATIVE
HCT VFR BLD AUTO: 45.1 % (ref 37–47)
HGB BLD-MCNC: 14.4 G/DL (ref 12–16)
HGB UR QL STRIP: ABNORMAL
IMM GRANULOCYTES # BLD AUTO: 0.02 X10(3)/MCL (ref 0–0.04)
IMM GRANULOCYTES NFR BLD AUTO: 0.3 %
INR PPP: 0.9
KETONES UR QL STRIP: NEGATIVE
LEUKOCYTE ESTERASE UR QL STRIP: NEGATIVE
LIPASE SERPL-CCNC: 20 U/L
LYMPHOCYTES # BLD AUTO: 3.77 X10(3)/MCL (ref 0.6–4.6)
LYMPHOCYTES NFR BLD AUTO: 48.6 %
MCH RBC QN AUTO: 26.2 PG (ref 27–31)
MCHC RBC AUTO-ENTMCNC: 31.9 G/DL (ref 33–36)
MCV RBC AUTO: 82 FL (ref 80–94)
MONOCYTES # BLD AUTO: 0.4 X10(3)/MCL (ref 0.1–1.3)
MONOCYTES NFR BLD AUTO: 5.2 %
NEUTROPHILS # BLD AUTO: 3.09 X10(3)/MCL (ref 2.1–9.2)
NEUTROPHILS NFR BLD AUTO: 39.7 %
NITRITE UR QL STRIP: NEGATIVE
OHS QRS DURATION: 76 MS
OHS QTC CALCULATION: 430 MS
PH UR STRIP: 7 [PH]
PLATELET # BLD AUTO: 378 X10(3)/MCL (ref 130–400)
PMV BLD AUTO: 9.9 FL (ref 7.4–10.4)
POTASSIUM SERPL-SCNC: 4.7 MMOL/L (ref 3.5–5.1)
PROT SERPL-MCNC: 7.3 GM/DL (ref 6.4–8.3)
PROT UR QL STRIP: NEGATIVE
PROTHROMBIN TIME: 12.4 SECONDS (ref 12.5–14.5)
RBC # BLD AUTO: 5.5 X10(6)/MCL (ref 4.2–5.4)
RBC #/AREA URNS AUTO: NORMAL /HPF
RSV A 5' UTR RNA NPH QL NAA+PROBE: NOT DETECTED
SARS-COV-2 RNA RESP QL NAA+PROBE: NOT DETECTED
SODIUM SERPL-SCNC: 141 MMOL/L (ref 136–145)
SP GR UR STRIP.AUTO: 1.01 (ref 1–1.03)
SQUAMOUS #/AREA URNS AUTO: NORMAL /HPF
TROPONIN I SERPL-MCNC: <0.01 NG/ML (ref 0–0.04)
UROBILINOGEN UR STRIP-ACNC: 0.2
WBC # SPEC AUTO: 7.76 X10(3)/MCL (ref 4.5–11.5)
WBC #/AREA URNS AUTO: NORMAL /HPF

## 2024-05-25 PROCEDURE — 96374 THER/PROPH/DIAG INJ IV PUSH: CPT | Mod: 59

## 2024-05-25 PROCEDURE — 80053 COMPREHEN METABOLIC PANEL: CPT | Performed by: EMERGENCY MEDICINE

## 2024-05-25 PROCEDURE — 81003 URINALYSIS AUTO W/O SCOPE: CPT | Performed by: EMERGENCY MEDICINE

## 2024-05-25 PROCEDURE — 85730 THROMBOPLASTIN TIME PARTIAL: CPT | Performed by: EMERGENCY MEDICINE

## 2024-05-25 PROCEDURE — 84484 ASSAY OF TROPONIN QUANT: CPT | Performed by: EMERGENCY MEDICINE

## 2024-05-25 PROCEDURE — 96376 TX/PRO/DX INJ SAME DRUG ADON: CPT

## 2024-05-25 PROCEDURE — 96375 TX/PRO/DX INJ NEW DRUG ADDON: CPT | Mod: 59

## 2024-05-25 PROCEDURE — 94761 N-INVAS EAR/PLS OXIMETRY MLT: CPT

## 2024-05-25 PROCEDURE — 85610 PROTHROMBIN TIME: CPT | Performed by: EMERGENCY MEDICINE

## 2024-05-25 PROCEDURE — 85025 COMPLETE CBC W/AUTO DIFF WBC: CPT | Performed by: EMERGENCY MEDICINE

## 2024-05-25 PROCEDURE — 83690 ASSAY OF LIPASE: CPT | Performed by: EMERGENCY MEDICINE

## 2024-05-25 PROCEDURE — 83880 ASSAY OF NATRIURETIC PEPTIDE: CPT | Performed by: EMERGENCY MEDICINE

## 2024-05-25 PROCEDURE — 25500020 PHARM REV CODE 255: Performed by: EMERGENCY MEDICINE

## 2024-05-25 PROCEDURE — 11000001 HC ACUTE MED/SURG PRIVATE ROOM

## 2024-05-25 PROCEDURE — 25000003 PHARM REV CODE 250: Performed by: EMERGENCY MEDICINE

## 2024-05-25 PROCEDURE — 0241U COVID/RSV/FLU A&B PCR: CPT | Performed by: EMERGENCY MEDICINE

## 2024-05-25 PROCEDURE — 63600175 PHARM REV CODE 636 W HCPCS: Performed by: INTERNAL MEDICINE

## 2024-05-25 PROCEDURE — G0378 HOSPITAL OBSERVATION PER HR: HCPCS

## 2024-05-25 PROCEDURE — 99285 EMERGENCY DEPT VISIT HI MDM: CPT | Mod: 25

## 2024-05-25 PROCEDURE — 93005 ELECTROCARDIOGRAM TRACING: CPT

## 2024-05-25 PROCEDURE — 83735 ASSAY OF MAGNESIUM: CPT | Performed by: EMERGENCY MEDICINE

## 2024-05-25 PROCEDURE — 36415 COLL VENOUS BLD VENIPUNCTURE: CPT | Performed by: EMERGENCY MEDICINE

## 2024-05-25 PROCEDURE — 63600175 PHARM REV CODE 636 W HCPCS: Performed by: EMERGENCY MEDICINE

## 2024-05-25 PROCEDURE — 96372 THER/PROPH/DIAG INJ SC/IM: CPT | Mod: 59 | Performed by: INTERNAL MEDICINE

## 2024-05-25 RX ORDER — ACETAMINOPHEN 325 MG/1
650 TABLET ORAL EVERY 4 HOURS PRN
Status: DISCONTINUED | OUTPATIENT
Start: 2024-05-25 | End: 2024-05-26 | Stop reason: HOSPADM

## 2024-05-25 RX ORDER — HYDROCODONE BITARTRATE AND ACETAMINOPHEN 5; 325 MG/1; MG/1
1 TABLET ORAL EVERY 4 HOURS PRN
Status: DISCONTINUED | OUTPATIENT
Start: 2024-05-25 | End: 2024-05-26 | Stop reason: HOSPADM

## 2024-05-25 RX ORDER — METOPROLOL TARTRATE 1 MG/ML
2.5 INJECTION, SOLUTION INTRAVENOUS
Status: COMPLETED | OUTPATIENT
Start: 2024-05-25 | End: 2024-05-25

## 2024-05-25 RX ORDER — SODIUM CHLORIDE 0.9 % (FLUSH) 0.9 %
10 SYRINGE (ML) INJECTION
Status: DISCONTINUED | OUTPATIENT
Start: 2024-05-25 | End: 2024-05-26 | Stop reason: HOSPADM

## 2024-05-25 RX ORDER — ENOXAPARIN SODIUM 100 MG/ML
40 INJECTION SUBCUTANEOUS EVERY 12 HOURS
Status: DISCONTINUED | OUTPATIENT
Start: 2024-05-25 | End: 2024-05-26 | Stop reason: HOSPADM

## 2024-05-25 RX ORDER — ZOLPIDEM TARTRATE 5 MG/1
5 TABLET ORAL NIGHTLY PRN
Status: DISCONTINUED | OUTPATIENT
Start: 2024-05-25 | End: 2024-05-26 | Stop reason: HOSPADM

## 2024-05-25 RX ORDER — SODIUM CHLORIDE 9 MG/ML
500 INJECTION, SOLUTION INTRAVENOUS
Status: DISCONTINUED | OUTPATIENT
Start: 2024-05-25 | End: 2024-05-25

## 2024-05-25 RX ORDER — AMOXICILLIN 500 MG/1
500 CAPSULE ORAL EVERY 12 HOURS
COMMUNITY
Start: 2024-05-22

## 2024-05-25 RX ORDER — NAPROXEN SODIUM 220 MG/1
162 TABLET, FILM COATED ORAL DAILY
Qty: 30 TABLET | Refills: 0 | Status: SHIPPED | OUTPATIENT
Start: 2024-05-25 | End: 2024-05-25 | Stop reason: SDUPTHER

## 2024-05-25 RX ORDER — FUROSEMIDE 10 MG/ML
40 INJECTION INTRAMUSCULAR; INTRAVENOUS ONCE
Status: COMPLETED | OUTPATIENT
Start: 2024-05-26 | End: 2024-05-26

## 2024-05-25 RX ORDER — TALC
6 POWDER (GRAM) TOPICAL NIGHTLY PRN
Status: DISCONTINUED | OUTPATIENT
Start: 2024-05-25 | End: 2024-05-25

## 2024-05-25 RX ORDER — FUROSEMIDE 10 MG/ML
40 INJECTION INTRAMUSCULAR; INTRAVENOUS
Status: COMPLETED | OUTPATIENT
Start: 2024-05-25 | End: 2024-05-25

## 2024-05-25 RX ORDER — LORAZEPAM 2 MG/ML
1 INJECTION INTRAMUSCULAR
Status: COMPLETED | OUTPATIENT
Start: 2024-05-25 | End: 2024-05-25

## 2024-05-25 RX ORDER — METOPROLOL TARTRATE 25 MG/1
25 TABLET, FILM COATED ORAL 2 TIMES DAILY
Status: DISCONTINUED | OUTPATIENT
Start: 2024-05-25 | End: 2024-05-26 | Stop reason: HOSPADM

## 2024-05-25 RX ORDER — ERGOCALCIFEROL 1.25 MG/1
50000 CAPSULE ORAL
COMMUNITY
Start: 2024-04-29

## 2024-05-25 RX ORDER — ALPRAZOLAM 0.5 MG/1
0.5 TABLET ORAL NIGHTLY PRN
COMMUNITY
Start: 2024-05-10

## 2024-05-25 RX ORDER — NAPROXEN SODIUM 220 MG/1
162 TABLET, FILM COATED ORAL
Status: COMPLETED | OUTPATIENT
Start: 2024-05-25 | End: 2024-05-25

## 2024-05-25 RX ORDER — ALPRAZOLAM 0.25 MG/1
0.25 TABLET ORAL 3 TIMES DAILY PRN
Status: DISCONTINUED | OUTPATIENT
Start: 2024-05-25 | End: 2024-05-26 | Stop reason: HOSPADM

## 2024-05-25 RX ORDER — IBUPROFEN 200 MG
1 TABLET ORAL DAILY
Status: DISCONTINUED | OUTPATIENT
Start: 2024-05-25 | End: 2024-05-26 | Stop reason: HOSPADM

## 2024-05-25 RX ORDER — ONDANSETRON HYDROCHLORIDE 2 MG/ML
4 INJECTION, SOLUTION INTRAVENOUS EVERY 8 HOURS PRN
Status: DISCONTINUED | OUTPATIENT
Start: 2024-05-25 | End: 2024-05-26 | Stop reason: HOSPADM

## 2024-05-25 RX ADMIN — METOPROLOL TARTRATE 25 MG: 25 TABLET, FILM COATED ORAL at 10:05

## 2024-05-25 RX ADMIN — METOPROLOL TARTRATE 2.5 MG: 1 INJECTION, SOLUTION INTRAVENOUS at 06:05

## 2024-05-25 RX ADMIN — FUROSEMIDE 40 MG: 10 INJECTION, SOLUTION INTRAMUSCULAR; INTRAVENOUS at 04:05

## 2024-05-25 RX ADMIN — ENOXAPARIN SODIUM 40 MG: 40 INJECTION SUBCUTANEOUS at 10:05

## 2024-05-25 RX ADMIN — LORAZEPAM 1 MG: 2 INJECTION INTRAMUSCULAR; INTRAVENOUS at 03:05

## 2024-05-25 RX ADMIN — IOPAMIDOL 100 ML: 755 INJECTION, SOLUTION INTRAVENOUS at 03:05

## 2024-05-25 RX ADMIN — METOPROLOL TARTRATE 2.5 MG: 1 INJECTION, SOLUTION INTRAVENOUS at 04:05

## 2024-05-25 RX ADMIN — ASPIRIN 81 MG CHEWABLE TABLET 162 MG: 81 TABLET CHEWABLE at 04:05

## 2024-05-25 NOTE — CONSULTS
[facility] Emergency Dept    Cardiology  Consult Note    Patient Name: Larisa Marcus  MRN: [id number]  Admission Date: 5/25/2024  Hospital Length of Stay: 0 days  Code Status: No Order   Attending Provider: Jonathan Childers MD   Consulting Provider: Jovani Tineo MD  Primary Care Physician: No, Primary Doctor  Principal Problem:<principal problem not specified>    Patient information was obtained from patient and ER records.     Subjective:     Chief Complaint/Reason for Consult: Chest pain    HPI:  Patient is 43-year-old female came to emergency room complaining of palpitations.  Patient reported this morning she felt significant palpitations that was associated with dizziness and shortness of breath.  She had 1 episode of chest pain which resolved.  She tried all position change but could not get her palpitations resolved.  She was seen in emergency room was found to be in accelerated junctional rhythm.  CT chest was done showed some ground-glass appearance consistent with heart failure otherwise no PE.  Patient was given Lasix and metoprolol that helped her heart rate as well shortness of breath was improved to.  Her heart rate is still between 100-120 with minimal activity.  Of note patient is obese her BMI is 53.  She reported that she is active she works as a  and was working last night.  She is a smoker denied any alcohol or drug use.  She has not quit using any medications.  Or started any new medication.  Patient has been having these symptoms of palpitations, vertigo, shortness of breath, chest pain for the last few months and has been seen in emergency room and physician's office few times.  She was also seen in our office on [address] and a monitor was ordered.  She just finished a monitor and returned it but it is not read yet.   months CP, palpitations , vertigo echo:          Past Medical History:   Diagnosis Date    Benign paroxysmal vertigo, bilateral      Past Surgical History:    Procedure Laterality Date    BILATERAL TUBAL LIGATION Bilateral      SECTION      x1     Review of patient's allergies indicates:  No Known Allergies  No current facility-administered medications on file prior to encounter.     Current Outpatient Medications on File Prior to Encounter   Medication Sig    cyclobenzaprine (FLEXERIL) 10 MG tablet Take 10 mg by mouth 3 (three) times daily.    fluticasone propionate (FLONASE) 50 mcg/actuation nasal spray 2 sprays by Each Nostril route once daily.    furosemide (LASIX) 20 MG tablet Take 1 tablet (20 mg total) by mouth daily as needed (swelling).    hydrOXYzine pamoate (VISTARIL) 25 MG Cap Take 25 mg by mouth.    meclizine (ANTIVERT) 25 mg tablet Take 2 tablets (50 mg total) by mouth 2 (two) times daily as needed for Dizziness.    naproxen (NAPROSYN) 500 MG tablet Take 500 mg by mouth 2 (two) times daily.     Family History       Problem Relation (Age of Onset)    Diabetes Mother, Sister, Brother    No Known Problems Father          Tobacco Use    Smoking status: Every Day     Average packs/day: 0.5 packs/day for 10.0 years (5.0 ttl pk-yrs)     Types: Cigarettes     Start date: 1998    Smokeless tobacco: Never   Substance and Sexual Activity    Alcohol use: Not Currently    Drug use: Never    Sexual activity: Yes     Partners: Male     Birth control/protection: None       Review of Systems:  Comprehensive review of systems performed is negative except written above.  Objective:     Vital Signs (Most Recent):  Temp: 97.4 °F (36.3 °C) (24 1452)  Pulse: (!) 119 (24 1600)  Resp: (!) 26 (24 1615)  BP: (!) 132/104 (24 1644)  SpO2: 100 % (24 1530) Vital Signs (24h Range):  Temp:  [97.4 °F (36.3 °C)] 97.4 °F (36.3 °C)  Pulse:  [116-137] 119  Resp:  [8-26] 26  SpO2:  [95 %-100 %] 100 %  BP: (121-132)/() 132/104   Weight: (!) 154.2 kg (340 lb)  Body mass index is 53.25 kg/m².  SpO2: 100 %     No intake or output data in the 24 hours  ending 05/25/24 1702  Lines/Drains/Airways       Peripheral Intravenous Line  Duration                  Peripheral IV - Single Lumen 05/25/24 1509 22 G Anterior;Proximal;Right Forearm <1 day                  Significant Labs:  Recent Results (from the past 72 hour(s))   EKG 12-lead    Collection Time: 05/25/24  2:52 PM   Result Value Ref Range    QRS Duration 76 ms    [facility] QTC Calculation 430 ms   Comprehensive metabolic panel    Collection Time: 05/25/24  3:07 PM   Result Value Ref Range    Sodium 141 136 - 145 mmol/L    Potassium 4.7 3.5 - 5.1 mmol/L    Chloride 109 (H) 98 - 107 mmol/L    CO2 24 22 - 29 mmol/L    Glucose 106 (H) 74 - 100 mg/dL    Blood Urea Nitrogen 10.0 7.0 - 18.7 mg/dL    Creatinine 0.73 0.55 - 1.02 mg/dL    Calcium 9.0 8.4 - 10.2 mg/dL    Protein Total 7.3 6.4 - 8.3 gm/dL    Albumin 3.4 (L) 3.5 - 5.0 g/dL    Globulin 3.9 (H) 2.4 - 3.5 gm/dL    Albumin/Globulin Ratio 0.9 (L) 1.1 - 2.0 ratio    Bilirubin Total 0.2 <=1.5 mg/dL    ALP 91 40 - 150 unit/L    ALT 11 0 - 55 unit/L    AST 17 5 - 34 unit/L    eGFR >60 mL/min/1.73/m2    Anion Gap 8.0 mEq/L    BUN/Creatinine Ratio 14    Lipase    Collection Time: 05/25/24  3:07 PM   Result Value Ref Range    Lipase Level 20 <=60 U/L   Troponin I    Collection Time: 05/25/24  3:07 PM   Result Value Ref Range    Troponin-I <0.010 0.000 - 0.045 ng/mL   Brain natriuretic peptide    Collection Time: 05/25/24  3:07 PM   Result Value Ref Range    Natriuretic Peptide 19.1 <=100.0 pg/mL   CBC with Differential    Collection Time: 05/25/24  3:07 PM   Result Value Ref Range    WBC 7.76 4.50 - 11.50 x10(3)/mcL    RBC 5.50 (H) 4.20 - 5.40 x10(6)/mcL    Hgb 14.4 12.0 - 16.0 g/dL    Hct 45.1 37.0 - 47.0 %    MCV 82.0 80.0 - 94.0 fL    MCH 26.2 (L) 27.0 - 31.0 pg    MCHC 31.9 (L) 33.0 - 36.0 g/dL    RDW 14.2 11.5 - 17.0 %    Platelet 378 130 - 400 x10(3)/mcL    MPV 9.9 7.4 - 10.4 fL    Neut % 39.7 %    Lymph % 48.6 %    Mono % 5.2 %    Eos % 5.2 %    Basophil % 1.0 %     Lymph # 3.77 0.6 - 4.6 x10(3)/mcL    Neut # 3.09 2.1 - 9.2 x10(3)/mcL    Mono # 0.40 0.1 - 1.3 x10(3)/mcL    Eos # 0.40 0 - 0.9 x10(3)/mcL    Baso # 0.08 <=0.2 x10(3)/mcL    IG# 0.02 0 - 0.04 x10(3)/mcL    IG% 0.3 %   Protime-INR    Collection Time: 05/25/24  3:24 PM   Result Value Ref Range    PT 12.4 (L) 12.5 - 14.5 seconds    INR 0.9 <=1.3   APTT    Collection Time: 05/25/24  3:24 PM   Result Value Ref Range    PTT 32.8 24.6 - 37.2 seconds   COVID/RSV/FLU A&B PCR    Collection Time: 05/25/24  3:50 PM   Result Value Ref Range    Influenza A PCR Not Detected Not Detected    Influenza B PCR Not Detected Not Detected    Respiratory Syncytial Virus PCR Not Detected Not Detected    SARS-CoV-2 PCR Not Detected Not Detected, Negative     Significant Imaging:  Imaging Results              CTA Chest Non-Coronary (PE Studies) (Final result)  Result time 05/25/24 16:05:50      Final result by Ozzy Maya MD (05/25/24 16:05:50)                   Impression:      1. No evidence of filling defect to suggest pulmonary embolism.  2. Ground-glass attenuation throughout both lungs with mild mosaic attenuation in the lower lobes.  Findings likely reflect edema with a small airways infectious or inflammatory process not excluded.      Electronically signed by: Ozzy Maya  Date:    05/25/2024  Time:    16:05               Narrative:    EXAMINATION:  CTA CHEST NON CORONARY (PE STUDIES)    CLINICAL HISTORY:  Pulmonary embolism (PE) suspected, neg D-dimer;    TECHNIQUE:  Low dose axial images, sagittal and coronal reformations were obtained from the thoracic inlet to the lung bases following the IV administration of 100 mL of Omnipaque 350.  Contrast timing was optimized to evaluate the pulmonary arteries.    COMPARISON:  Radiograph 05/25/2024, 02/15/2024    FINDINGS:  Pulmonary vasculature: Suboptimal opacification of the pulmonary arterial system due to contrast bolus timing.  No filling defect identified to the segmental  level.    Aorta: Left-sided aortic arch.  No aneurysm and no significant atherosclerosis    Base of Neck: No significant abnormality.    Thoracic soft tissues: Normal.    Heart: Borderline enlarged.  No effusion.    Katlyn/Mediastinum: No pathologic ly enlargement.    Airways: Patent.    Lungs/Pleura: Ground-glass attenuation throughout both lungs with mild mosaic attenuation in the bilateral lower lobes.  No consolidation, pneumothorax or pleural effusion.    Esophagus: Normal.    Upper Abdomen: Cholelithiasis.  No gallbladder wall thickening or pericholecystic fluid to suggest acute cholecystitis.  Right renal cyst measures 2.9 cm.  Left adrenal nodule measures 1.2 cm and demonstrates attenuation compatible with an adenoma.    Bones: No acute fracture. No suspicious lytic or sclerotic lesions.                                       X-Ray Chest AP Portable (Final result)  Result time 05/25/24 15:29:48      Final result by Ozzy Maya MD (05/25/24 15:29:48)                   Impression:      No consolidation or evidence of acute cardiac decompensation.      Electronically signed by: Ozzy Maya  Date:    05/25/2024  Time:    15:29               Narrative:    EXAMINATION:  XR CHEST AP PORTABLE    CLINICAL HISTORY:  Chest pain, unspecified    TECHNIQUE:  Single frontal view of the chest was performed.    COMPARISON:  Radiograph 02/15/2024    FINDINGS:  The cardiomediastinal silhouette is mildly enlarged.  Similar mild elevation of the right hemidiaphragm.  No consolidation, pneumothorax or pleural effusion.  No acute osseous abnormality identified.                                    EKG:  Sinus rhythm with ST depression in inferior leads and anterior lateral leads.    Telemetry:  Normal sinus rhythm    Physical Exam  Alert oriented x3  Auscultation was not performed as there was equipment failure.  Bilateral extremities appeared enlarged.  Home Medications:   No current facility-administered medications on  file prior to encounter.     Current Outpatient Medications on File Prior to Encounter   Medication Sig Dispense Refill    cyclobenzaprine (FLEXERIL) 10 MG tablet Take 10 mg by mouth 3 (three) times daily.      fluticasone propionate (FLONASE) 50 mcg/actuation nasal spray 2 sprays by Each Nostril route once daily.      furosemide (LASIX) 20 MG tablet Take 1 tablet (20 mg total) by mouth daily as needed (swelling). 30 tablet 0    hydrOXYzine pamoate (VISTARIL) 25 MG Cap Take 25 mg by mouth.      meclizine (ANTIVERT) 25 mg tablet Take 2 tablets (50 mg total) by mouth 2 (two) times daily as needed for Dizziness. 60 tablet 0    naproxen (NAPROSYN) 500 MG tablet Take 500 mg by mouth 2 (two) times daily.       Current Inpatient Medications:  No current facility-administered medications for this encounter.    Current Outpatient Medications:     cyclobenzaprine (FLEXERIL) 10 MG tablet, Take 10 mg by mouth 3 (three) times daily., Disp: , Rfl:     fluticasone propionate (FLONASE) 50 mcg/actuation nasal spray, 2 sprays by Each Nostril route once daily., Disp: , Rfl:     furosemide (LASIX) 20 MG tablet, Take 1 tablet (20 mg total) by mouth daily as needed (swelling)., Disp: 30 tablet, Rfl: 0    hydrOXYzine pamoate (VISTARIL) 25 MG Cap, Take 25 mg by mouth., Disp: , Rfl:     meclizine (ANTIVERT) 25 mg tablet, Take 2 tablets (50 mg total) by mouth 2 (two) times daily as needed for Dizziness., Disp: 60 tablet, Rfl: 0    naproxen (NAPROSYN) 500 MG tablet, Take 500 mg by mouth 2 (two) times daily., Disp: , Rfl:   VTE Risk Mitigation (From admission, onward)      None          Assessment:   Sinus tachycardia cause unknown could be inappropriate sinus tachycardia  Abnormal EKG with T-wave inversions in inferior and anterolateral leads.  Heart failure unknown type as per CT  4. Lower extrimities swelling  5. Echocardiogram in August 2023 showed normal left ventricle systolic function.  She recently last week had an echocardiogram done  the results are not available in the system but she was told that her heart was not working optimally.      Plan:   No specific cause of this tachycardia noted though deconditioning can not be completely ruled out especially since patient is significantly overweight.   Patient's labs are essentially normal.  It is very hard to determine heart failure clinically as patient is obese.  Would recommend to do a trial of 40 mg IV Lasix in the morning.  Monitor labs closely.  I will recommend to repeat echocardiogram EP and not able to find the results of her recent echocardiogram.  Especially since she was told that her heart function was not normal  She can be given a trial of metoprolol and see if her heart rate improves.  If no cause of her tachycardia is found then it should be labeled as inappropriate sinus tachycardia.  She can be given Corlanor at the time of discharge.  I will also recommend to do a UDS on the patient.   Patient should be followed up as outpatient for possible stress test especially in the setting of abnormal EKG.    Thank you for involving us in this patient's care.  Please do not hesitate contact me for any questions.      Patient meets ASPEN criteria for   malnutrition of   per RD assessment as evidenced by:                       A minimum of two characteristics is recommended for diagnosis of either severe or non-severe malnutrition.    Thank you for your consult.     Jovani Tineo MD  Cardiology  [facility] - Emergency Dept  05/25/2024

## 2024-05-26 VITALS
OXYGEN SATURATION: 93 % | BODY MASS INDEX: 45.99 KG/M2 | SYSTOLIC BLOOD PRESSURE: 115 MMHG | RESPIRATION RATE: 20 BRPM | TEMPERATURE: 98 F | DIASTOLIC BLOOD PRESSURE: 69 MMHG | HEIGHT: 67 IN | HEART RATE: 66 BPM | WEIGHT: 293 LBS

## 2024-05-26 PROBLEM — I49.8 ACCELERATED JUNCTIONAL RHYTHM: Status: ACTIVE | Noted: 2024-05-26

## 2024-05-26 LAB
MAGNESIUM SERPL-MCNC: 1.9 MG/DL (ref 1.6–2.6)
OHS QRS DURATION: 76 MS
OHS QTC CALCULATION: 430 MS
POCT GLUCOSE: 88 MG/DL (ref 70–110)
TROPONIN I SERPL-MCNC: <0.01 NG/ML (ref 0–0.04)

## 2024-05-26 PROCEDURE — 25000003 PHARM REV CODE 250: Performed by: EMERGENCY MEDICINE

## 2024-05-26 PROCEDURE — 96375 TX/PRO/DX INJ NEW DRUG ADDON: CPT

## 2024-05-26 PROCEDURE — S4991 NICOTINE PATCH NONLEGEND: HCPCS | Performed by: INTERNAL MEDICINE

## 2024-05-26 PROCEDURE — G0378 HOSPITAL OBSERVATION PER HR: HCPCS

## 2024-05-26 PROCEDURE — 36415 COLL VENOUS BLD VENIPUNCTURE: CPT | Performed by: EMERGENCY MEDICINE

## 2024-05-26 PROCEDURE — 63600175 PHARM REV CODE 636 W HCPCS: Performed by: INTERNAL MEDICINE

## 2024-05-26 PROCEDURE — 63600175 PHARM REV CODE 636 W HCPCS: Performed by: EMERGENCY MEDICINE

## 2024-05-26 PROCEDURE — 25000003 PHARM REV CODE 250: Performed by: INTERNAL MEDICINE

## 2024-05-26 PROCEDURE — 94761 N-INVAS EAR/PLS OXIMETRY MLT: CPT

## 2024-05-26 PROCEDURE — 96376 TX/PRO/DX INJ SAME DRUG ADON: CPT

## 2024-05-26 PROCEDURE — 84484 ASSAY OF TROPONIN QUANT: CPT | Performed by: EMERGENCY MEDICINE

## 2024-05-26 RX ORDER — IBUPROFEN 200 MG
1 TABLET ORAL DAILY
Status: CANCELLED | OUTPATIENT
Start: 2024-05-26

## 2024-05-26 RX ORDER — METOPROLOL TARTRATE 25 MG/1
25 TABLET, FILM COATED ORAL 2 TIMES DAILY
Qty: 180 TABLET | Refills: 3 | Status: SHIPPED | OUTPATIENT
Start: 2024-05-26 | End: 2025-05-26

## 2024-05-26 RX ADMIN — FUROSEMIDE 40 MG: 10 INJECTION, SOLUTION INTRAMUSCULAR; INTRAVENOUS at 05:05

## 2024-05-26 RX ADMIN — ONDANSETRON 4 MG: 2 INJECTION INTRAMUSCULAR; INTRAVENOUS at 05:05

## 2024-05-26 RX ADMIN — METOPROLOL TARTRATE 25 MG: 25 TABLET, FILM COATED ORAL at 09:05

## 2024-05-26 RX ADMIN — NICOTINE 1 PATCH: 14 PATCH TRANSDERMAL at 09:05

## 2024-05-26 NOTE — H&P
"[facility] - Medical Surgical Unit    History & Physical      Patient Name: Larisa Marcus  MRN: [id number]  Admission Date: 2024  Attending Physician:  Elías Blanton MD  Primary Care Provider: Rosa Maria, Primary Doctor         Patient information was obtained from patient and ER records.     Subjective:     Principal Problem: SVT    Chief Complaint:   Chief Complaint   Patient presents with    Shortness of Breath     C/o weakness, shortness of breath, and "funny feeling" in her chest this morning. Denies pain.        HPI: Ms Marcus presented to the emergency room with worsening shortness of breath and palpitations. She recently had a Holter monitor and turned it into her cardiologist a couple days ago. She denies chest pain at time of exam. She is in sinus rhythm at time of exam. She is on 2 L nasal cannula with oxygen  saturations before being placed on supplemental oxygen of 90% on room air. She does have a history consistent with sleep apnea but has not had a sleep study. Cardiology was consulted from ER due to SVT. Echocardiogram has been ordered.    Past Medical History:   Diagnosis Date    Benign paroxysmal vertigo, bilateral        Past Surgical History:   Procedure Laterality Date    BILATERAL TUBAL LIGATION Bilateral      SECTION      x1       Review of patient's allergies indicates:  No Known Allergies    No current facility-administered medications on file prior to encounter.     Current Outpatient Medications on File Prior to Encounter   Medication Sig    ALPRAZolam (XANAX) 0.5 MG tablet Take 0.5 mg by mouth nightly as needed.    amoxicillin (AMOXIL) 500 MG capsule Take 500 mg by mouth every 12 (twelve) hours.    ergocalciferol (ERGOCALCIFEROL) 50,000 unit Cap Take 50,000 Units by mouth every 7 days.    meclizine (ANTIVERT) 25 mg tablet Take 2 tablets (50 mg total) by mouth 2 (two) times daily as needed for Dizziness.    cyclobenzaprine (FLEXERIL) 10 MG tablet Take 10 mg by mouth 3 " (three) times daily.    furosemide (LASIX) 20 MG tablet Take 1 tablet (20 mg total) by mouth daily as needed (swelling).    hydrOXYzine pamoate (VISTARIL) 25 MG Cap Take 25 mg by mouth.    naproxen (NAPROSYN) 500 MG tablet Take 500 mg by mouth 2 (two) times daily.    [DISCONTINUED] fluticasone propionate (FLONASE) 50 mcg/actuation nasal spray 2 sprays by Each Nostril route once daily.     Family History       Problem Relation (Age of Onset)    Diabetes Mother, Sister, Brother    No Known Problems Father          Tobacco Use    Smoking status: Every Day     Average packs/day: 0.5 packs/day for 10.0 years (5.0 ttl pk-yrs)     Types: Cigarettes     Start date: 1/1/1998    Smokeless tobacco: Never   Substance and Sexual Activity    Alcohol use: Not Currently    Drug use: Never    Sexual activity: Yes     Partners: Male     Birth control/protection: None     Review of Systems   Constitutional:  Negative for chills and fever.   HENT:  Negative for sinus pain and sore throat.    Eyes:  Negative for photophobia and pain.   Respiratory:  Positive for apnea and wheezing. Negative for choking and stridor.    Cardiovascular:  Positive for palpitations. Negative for chest pain.   Gastrointestinal:  Negative for abdominal distention, nausea and vomiting.   Endocrine: Negative for cold intolerance and heat intolerance.   Genitourinary:  Negative for dysuria and flank pain.   Musculoskeletal:  Negative for gait problem and joint swelling.   Skin:  Negative for pallor and rash.   Allergic/Immunologic: Negative for environmental allergies and food allergies.   Neurological:  Negative for seizures and light-headedness.   Psychiatric/Behavioral:  Negative for agitation and confusion.      Objective:     Vital Signs (Most Recent):  Temp: 98.3 °F (36.8 °C) (05/25/24 1906)  Pulse: 73 (05/25/24 1906)  Resp: 20 (05/25/24 1906)  BP: (!) 89/62 (05/25/24 1906)  SpO2: (!) 91 % (05/25/24 1906) Vital Signs (24h Range):  Temp:  [97.4 °F (36.3  °C)-98.3 °F (36.8 °C)] 98.3 °F (36.8 °C)  Pulse:  [] 73  Resp:  [8-26] 20  SpO2:  [91 %-100 %] 91 %  BP: ()/() 89/62     Weight: (!) 154.2 kg (340 lb)  Body mass index is 53.25 kg/m².    Physical Exam  Constitutional:       General: She is not in acute distress.     Appearance: Normal appearance. She is obese.   HENT:      Head: Normocephalic and atraumatic.   Eyes:      General: No scleral icterus.     Extraocular Movements: Extraocular movements intact.   Cardiovascular:      Rate and Rhythm: Normal rate and regular rhythm.   Pulmonary:      Effort: Pulmonary effort is normal. No respiratory distress.   Abdominal:      General: There is no distension.      Palpations: Abdomen is soft.   Musculoskeletal:         General: No swelling. Normal range of motion.      Cervical back: Normal range of motion and neck supple.   Skin:     General: Skin is dry.      Coloration: Skin is not jaundiced.   Neurological:      General: No focal deficit present.      Mental Status: She is alert and oriented to person, place, and time.   Psychiatric:         Mood and Affect: Mood normal.         Behavior: Behavior normal.            Significant Labs: All pertinent labs within the past 24 hours have been reviewed.  Recent Lab Results  (Last 5 results in the past 24 hours)        05/25/24  1716   05/25/24  1656   05/25/24  1550   05/25/24  1524   05/25/24  1507        RSV Ag by Molecular Method     Not Detected           Influenza A, Molecular     Not Detected           Influenza B, Molecular     Not Detected           Albumin/Globulin Ratio         0.9       Albumin         3.4       ALP         91       ALT         11       Anion Gap         8.0       Appearance, UA Clear               PTT       32.8  Comment: For Minimal Heparin Infusion, the goal aPTT 64-85 seconds corresponds to an anti-Xa of 0.3-0.5.    For Low Intensity and High Intensity Heparin, the goal aPTT  seconds corresponds to an anti-Xa of  0.3-0.7         AST         17       Bacteria, UA Rare               Baso #         0.08       Basophil %         1.0       BILIRUBIN TOTAL         0.2       Bilirubin, UA Negative               BNP         19.1       BUN         10.0       BUN/CREAT RATIO         14       Calcium         9.0       Chloride         109       CO2         24       Color, UA Yellow               Creatinine         0.73       eGFR         >60       Eos #         0.40       Eos %         5.2       Globulin, Total         3.9       Glucose         106       Glucose, UA Negative               Hematocrit         45.1       Hemoglobin         14.4       Immature Grans (Abs)         0.02       Immature Granulocytes         0.3       INR       0.9         Ketones, UA Negative               Leukocyte Esterase, UA Negative               Lipase         20       Lymph #         3.77       LYMPH %         48.6       MCH         26.2       MCHC         31.9       MCV         82.0       Mono #         0.40       Mono %         5.2       MPV         9.9       Neut #         3.09       Neut %         39.7       NITRITE UA Negative               Blood, UA Trace-Intact               pH, UA 7.0               Platelet Count         378       Potassium         4.7       PROTEIN TOTAL         7.3       Protein, UA Negative               PT       12.4         RBC         5.50       RBC, UA 0-5               RDW         14.2       SARS-CoV2 (COVID-19) Qualitative PCR     Not Detected           Sodium         141       Specific Gravity,UA 1.010               Squam Epithel, UA Rare               Troponin I   <0.010       <0.010       Urobilinogen, UA 0.2               WBC, UA None Seen               WBC         7.76                              Significant Imaging: I have reviewed all pertinent imaging results/findings within the past 24 hours.    Assessment/Plan:     1 - SVT: cardiology consulted, telemetry, continue current medical management,  echocardiogram    2 - LADONNA: clinical diagnosis, recommend outpatient sleep study    3 - morbid obesity: healthy diet, activity as tolerated, diagnose and treat sleep apnea if present      VTE Risk Mitigation (From admission, onward)           Ordered     enoxaparin injection 40 mg  Daily         05/25/24 1959     IP VTE HIGH RISK PATIENT  Once         05/25/24 1959     Place sequential compression device  Until discontinued         05/25/24 1959     Place sequential compression device  Until discontinued         05/25/24 1800                  This visit is via telemedicine from [place], [place]  patient is located in [place], [place]    Full code    Plan discussed with patient, she is her own decision-maker and does understand and agree with plan    Home safety screen performed, no concerns voiced    Time spent 5/25/24 is 50 minutes    Elías Blanton JR, MD  Department of Hospital Medicine   [facility] - Medical Surgical Unit

## 2024-05-26 NOTE — PLAN OF CARE
Problem: Adult Inpatient Plan of Care  Goal: Plan of Care Review  Outcome: Progressing  Goal: Patient-Specific Goal (Individualized)  Outcome: Progressing  Goal: Absence of Hospital-Acquired Illness or Injury  Outcome: Progressing  Goal: Optimal Comfort and Wellbeing  Outcome: Progressing  Goal: Readiness for Transition of Care  Outcome: Progressing     Problem: Bariatric Environmental Safety  Goal: Safety Maintained with Care  Outcome: Progressing     Problem: Dysrhythmia  Goal: Normalized Cardiac Rhythm  Outcome: Progressing     Problem: Gas Exchange Impaired  Goal: Optimal Gas Exchange  Outcome: Progressing     Problem: Bariatric Environmental Safety  Goal: Safety Maintained with Care  Outcome: Progressing     Problem: Dysrhythmia  Goal: Normalized Cardiac Rhythm  Outcome: Progressing     Problem: Gas Exchange Impaired  Goal: Optimal Gas Exchange  Outcome: Progressing

## 2024-05-26 NOTE — DISCHARGE SUMMARY
[facility] - [facility] Surgical Unit  Hospital Medicine  Discharge Summary      Patient Name: Larisa Marcus  MRN: [id number]  Admission Date: 5/25/2024  Hospital Length of Stay: 1 days  Discharge Date and Time:  05/26/2024 8:43 AM  Attending Physician: Gino Plaza MD   Discharging Provider: Gino Plaza MD  Discharge Provider Team: Networked reference to record PCT   Primary Care Provider: Rosa Maria, Primary Doctor        HPI:   Ms Marcus presented to the emergency room with worsening shortness of breath and palpitations. She recently had a Holter monitor and turned it into her cardiologist a couple days ago. She denies chest pain at time of exam. She is in sinus rhythm at time of exam. She is on 2 L nasal cannula with oxygen  saturations before being placed on supplemental oxygen of 90% on room air. She does have a history consistent with sleep apnea but has not had a sleep study. Cardiology was consulted from ER due to SVT. Echocardiogram has been ordered.    * No surgery found *      Hospital Course:   5/26-her symptoms resolved overnight; she is now in sinus rhythm; she had no complaints when seen on rounds this morning    ---    1 - SVT/accelerated junctional rhythm  Patient was started on beta-blocker in the ED; converted to sinus rhythm early last night at which time symptoms resolved; she is currently followed by Cardiology, has had recent echo and Holter monitor  Is stable for discharge at this time; continue metoprolol keep scheduled cardiology follow-up appointment     LADONNA  Patient has a sleep study scheduled     morbid obesity  healthy diet    Tobacco use  She would benefit from cessation    Physical exam  Constitution-obese, normally developed female in NAD  HENT-normocephalic, atraumatic  Neck-supple  Respiratory-normal respirations; CTA with good air movement  Heart-RRR; normal S1 and S2; no murmurs, gallops  Abdomen-soft, nontender,  nondistended  Genitourinary-deferred  Musculoskeletal-no joint abnormalities, normal ROM throughout  Skin-warm, dry; no rashes  Neurologic-alert and oriented x3    Consults:   Consults (From admission, onward)          Status Ordering Provider     Inpatient consult to Registered Dietitian/Nutritionist  Once        Provider:  (Not yet assigned)    Acknowledged MARY IYER JR     Inpatient consult to Cardiology  Once        Provider:  Alonzo Gordilol MD    Completed DIMITRIS ADAMS            Final Active Diagnoses:    Diagnosis Date Noted POA    PRINCIPAL PROBLEM:  Accelerated junctional rhythm [I49.8] 05/26/2024 Yes      Problems Resolved During this Admission:      Discharged Condition: stable    Disposition: Home or Self Care    Follow Up:   Follow-up Information       No, Primary Doctor Follow up.               Alonzo Gordillo MD Follow up.    Specialty: Cardiology  Why: Keep scheduled appointment  Contact information:  [address] [facility] [id number] [phone]                           Patient Instructions:      Diet Cardiac     Reason for not Ordering Smoking Cessation Referral     Order Specific Question Answer Comments   Reason for not ordering: Not medically appropriate at this time      Reason for not Prescribing Nicotine Replacement     Order Specific Question Answer Comments   Reason for not Prescribing: Not medically appropriate at this time      Activity as tolerated     Medications:  Reconciled Home Medications:      Medication List        START taking these medications      metoprolol tartrate 25 MG tablet  Commonly known as: LOPRESSOR  Take 1 tablet (25 mg total) by mouth 2 (two) times daily.            CONTINUE taking these medications      ALPRAZolam 0.5 MG tablet  Commonly known as: XANAX  Take 0.5 mg by mouth nightly as needed.     amoxicillin 500 MG capsule  Commonly known as: AMOXIL  Take 500 mg by mouth every 12 (twelve) hours.     cyclobenzaprine 10 MG tablet  Commonly  known as: FLEXERIL  Take 10 mg by mouth 3 (three) times daily.     ergocalciferol 50,000 unit Cap  Commonly known as: ERGOCALCIFEROL  Take 50,000 Units by mouth every 7 days.     furosemide 20 MG tablet  Commonly known as: LASIX  Take 1 tablet (20 mg total) by mouth daily as needed (swelling).     hydrOXYzine pamoate 25 MG Cap  Commonly known as: VISTARIL  Take 25 mg by mouth.     meclizine 25 mg tablet  Commonly known as: ANTIVERT  Take 2 tablets (50 mg total) by mouth 2 (two) times daily as needed for Dizziness.     naproxen 500 MG tablet  Commonly known as: NAPROSYN  Take 500 mg by mouth 2 (two) times daily.              Significant Diagnostic Studies: Labs: CMP   Recent Labs   Lab 05/25/24  1507      K 4.7   CO2 24   BUN 10.0   CREATININE 0.73   CALCIUM 9.0   ALBUMIN 3.4*   BILITOT 0.2   ALKPHOS 91   AST 17   ALT 11   , CBC   Recent Labs   Lab 05/25/24  1507   WBC 7.76   HGB 14.4   HCT 45.1      , and Troponin   Recent Labs   Lab 05/25/24  1656 05/25/24  2113 05/26/24  0330   TROPONINI <0.010 <0.010 <0.010     Radiology:   CTA chest  Impression:  1. No evidence of filling defect to suggest pulmonary embolism.  2. Ground-glass attenuation throughout both lungs with mild mosaic attenuation in the lower lobes.  Findings likely reflect edema with a small airways infectious or inflammatory process not excluded.    CXR  Impression:  No consolidation or evidence of acute cardiac decompensation.    Pending Diagnostic Studies:       Procedure Component Value Units Date/Time    Magnesium [[id number]] Collected: 05/25/24 1507    Order Status: Sent Lab Status: In process Updated: 05/26/24 0835    Specimen: Blood           Indwelling Lines/Drains at time of discharge:   Lines/Drains/Airways       None                 Patient screened for social drivers of health:  Housing instability  Transportation needs  Utility difficulties  Interpersonal safety  ---no needs identified    Time spent on the discharge of  patient: 40 minutes         Gino Plaza MD  Department of Hospital Medicine  [facility]

## 2024-05-27 LAB
OHS QRS DURATION: 78 MS
OHS QTC CALCULATION: 440 MS

## 2024-05-30 NOTE — CLINICAL REVIEW
43-year-old female admitted on May 25th with shortness of breath, palpitations.  Found to have SVT.  Cardiology consulted.  Found to have an accelerated junctional rhythm.  Imaging demonstrated questionable ground-glass appearance with a possible heart failure.  Was given IV Lasix, metoprolol which improved her symptoms.  Cardiology deemed that her tachycardia was probable deconditioning.  No evidence of hemodynamic instability, hypoxia, hypercapnia, acute coronary syndrome, arrhythmia immediate concern.  No need for invasive intervention.  Discharged on hospital day 2.  Care could be provided observation setting        Irwin Johnson MD  Utilization Management  Physician Advisor  Hospitalist Medicine  05/30/2024

## 2024-06-06 ENCOUNTER — OFFICE VISIT (OUTPATIENT)
Dept: OTOLARYNGOLOGY | Facility: CLINIC | Age: 44
End: 2024-06-06
Payer: MEDICAID

## 2024-06-06 VITALS — DIASTOLIC BLOOD PRESSURE: 69 MMHG | TEMPERATURE: 98 F | SYSTOLIC BLOOD PRESSURE: 93 MMHG | HEART RATE: 83 BPM

## 2024-06-06 DIAGNOSIS — R42 DIZZINESS: Primary | ICD-10-CM

## 2024-06-06 PROCEDURE — 3078F DIAST BP <80 MM HG: CPT | Mod: CPTII,,, | Performed by: NURSE PRACTITIONER

## 2024-06-06 PROCEDURE — 99214 OFFICE O/P EST MOD 30 MIN: CPT | Mod: S$PBB,,, | Performed by: NURSE PRACTITIONER

## 2024-06-06 PROCEDURE — 1159F MED LIST DOCD IN RCRD: CPT | Mod: CPTII,,, | Performed by: NURSE PRACTITIONER

## 2024-06-06 PROCEDURE — 3074F SYST BP LT 130 MM HG: CPT | Mod: CPTII,,, | Performed by: NURSE PRACTITIONER

## 2024-06-06 PROCEDURE — 99214 OFFICE O/P EST MOD 30 MIN: CPT | Mod: PBBFAC | Performed by: NURSE PRACTITIONER

## 2024-06-06 RX ORDER — ATORVASTATIN CALCIUM 10 MG/1
10 TABLET, FILM COATED ORAL NIGHTLY
COMMUNITY
Start: 2024-04-29

## 2024-06-06 RX ORDER — MIRTAZAPINE 15 MG/1
15 TABLET, FILM COATED ORAL NIGHTLY
COMMUNITY
Start: 2024-06-05

## 2024-06-06 RX ORDER — NAPROXEN SODIUM 220 MG/1
TABLET, FILM COATED ORAL
COMMUNITY
Start: 2024-05-25

## 2024-06-06 RX ORDER — LORATADINE 10 MG/1
10 TABLET ORAL
COMMUNITY
Start: 2024-04-24

## 2024-06-06 RX ORDER — MECLIZINE HYDROCHLORIDE 25 MG/1
50 TABLET ORAL 2 TIMES DAILY PRN
Qty: 90 TABLET | Refills: 0 | Status: SHIPPED | OUTPATIENT
Start: 2024-06-06

## 2024-06-06 NOTE — PROGRESS NOTES
[facility]  Otolaryngology Clinic Note    Larisa Marcus  YOB: 1980    Chief Complaint:   Chief Complaint   Patient presents with    referral: Vertigo        HPI: 11/8/23: 43yoF referred for dizziness. States she had an initial episode of dizziness while driving 2-3 months ago wherein she got hot and anxious. She was able to drive herself to the ED. EKG and labs were unremarkable. States dizziness occurs with movement, sometimes walking, turning, and during sleep. Unable to say if turning during sleep is what exacerbates this. States she went to therapy to try to put her crystals back in place but that the therapist told her that he did not think the problem was with her crystals. She felt the exercises may have helped a little. Episodes occur 2-3x/day. She describes this as a sensation of spinning that lasts 5-10min. Sometimes she feels that she might pass out and feels this gives her anxiety attacks. Denies HL or aural pressure. Endorses infrequent, brief tinnitus. Denies any previous otologic hx. Denies medication changes, head trauma, or MVC's. Meclizine provides partial relief.     01/12/2024: F/u after vestibular battery. No change in dizziness. States she finds it distressing and scary.    06/06/2024: Returns for dizziness, stating she no longer thinks it is vertigo. Has been seen in the ED multiple times since last visit for dizziness and chest pain. Recent admission for SVT. Continues to describe dizziness primarily as near syncope but also states her head sometimes spins for seconds if she turns it quickly. This can be in any direction. Dizziness is relieved by meclizine. States one ED doctor told her he thought dizziness was r/t something in her nose. She denies any nasal congestion, rhinitis, or hx of recurrent sinus infections.    ROS:   10-point review of systems negative except per HPI      Review of patient's allergies indicates:  No Known Allergies    Past  Medical History:   Diagnosis Date    Benign paroxysmal vertigo, bilateral        Past Surgical History:   Procedure Laterality Date    BILATERAL TUBAL LIGATION Bilateral      SECTION      x1       Social History     Socioeconomic History    Marital status: Single    Number of children: 9   Tobacco Use    Smoking status: Every Day     Average packs/day: 0.5 packs/day for 10.0 years (5.0 ttl pk-yrs)     Types: Cigarettes     Start date: 1998    Smokeless tobacco: Never   Substance and Sexual Activity    Alcohol use: Not Currently    Drug use: Never    Sexual activity: Yes     Partners: Male     Birth control/protection: None     Social Determinants of Health     Financial Resource Strain: Low Risk  (10/25/2023)    Overall Financial Resource Strain (CARDIA)     Difficulty of Paying Living Expenses: Not hard at all   Food Insecurity: No Food Insecurity (10/25/2023)    Hunger Vital Sign     Worried About Running Out of Food in the Last Year: Never true     Ran Out of Food in the Last Year: Never true   Transportation Needs: No Transportation Needs (10/25/2023)    PRAPARE - Transportation     Lack of Transportation (Medical): No     Lack of Transportation (Non-Medical): No   Physical Activity: Inactive (10/25/2023)    Exercise Vital Sign     Days of Exercise per Week: 0 days     Minutes of Exercise per Session: 0 min   Stress: No Stress Concern Present (10/25/2023)    [employer] [place] [employer] - Occupational Stress Questionnaire     Feeling of Stress : Not at all   Housing Stability: Low Risk  (10/25/2023)    Housing Stability Vital Sign     Unable to Pay for Housing in the Last Year: No     Number of Places Lived in the Last Year: 2     Unstable Housing in the Last Year: No       Family History   Problem Relation Name Age of Onset    Diabetes Mother      No Known Problems Father      Diabetes Sister      Diabetes Brother         Outpatient Encounter Medications as of 2024   Medication Sig  Dispense Refill    ALPRAZolam (XANAX) 0.5 MG tablet Take 0.5 mg by mouth nightly as needed.      aspirin 81 MG Chew Take by mouth.      atorvastatin (LIPITOR) 10 MG tablet Take 10 mg by mouth every evening.      cyclobenzaprine (FLEXERIL) 10 MG tablet Take 10 mg by mouth 3 (three) times daily.      ergocalciferol (ERGOCALCIFEROL) 50,000 unit Cap Take 50,000 Units by mouth every 7 days.      furosemide (LASIX) 20 MG tablet Take 1 tablet (20 mg total) by mouth daily as needed (swelling). 30 tablet 0    loratadine (CLARITIN) 10 mg tablet Take 10 mg by mouth.      meclizine (ANTIVERT) 25 mg tablet Take 2 tablets (50 mg total) by mouth 2 (two) times daily as needed for Dizziness. 60 tablet 0    metoprolol tartrate (LOPRESSOR) 25 MG tablet Take 1 tablet (25 mg total) by mouth 2 (two) times daily. 180 tablet 3    mirtazapine (REMERON) 15 MG tablet Take 15 mg by mouth every evening.      naproxen (NAPROSYN) 500 MG tablet Take 500 mg by mouth 2 (two) times daily.      amoxicillin (AMOXIL) 500 MG capsule Take 500 mg by mouth every 12 (twelve) hours. (Patient not taking: Reported on 6/6/2024)      hydrOXYzine pamoate (VISTARIL) 25 MG Cap Take 25 mg by mouth.      [DISCONTINUED] fluticasone propionate (FLONASE) 50 mcg/actuation nasal spray 2 sprays by Each Nostril route once daily.      furosemide injection 40 mg       [DISCONTINUED] acetaminophen tablet 650 mg       [DISCONTINUED] ALPRAZolam tablet 0.25 mg       [DISCONTINUED] enoxaparin injection 40 mg       [DISCONTINUED] HYDROcodone-acetaminophen 5-325 mg per tablet 1 tablet       [DISCONTINUED] metoprolol tartrate (LOPRESSOR) tablet 25 mg       [DISCONTINUED] nicotine 14 mg/24 hr 1 patch       [DISCONTINUED] ondansetron injection 4 mg       [DISCONTINUED] sodium chloride 0.9% flush 10 mL       [DISCONTINUED] sodium chloride 0.9% flush 10 mL       [DISCONTINUED] zolpidem tablet 5 mg        No facility-administered encounter medications on file as of 6/6/2024.       Physical  Exam:  There were no vitals filed for this visit.    Physical Exam   General: NAD, voice normal  Neuro: AAO, CN II - XII grossly intact  Head/ Face: NCAT, symmetric, sensations intact bilaterally  Eyes: EOMI, PERRL  Ears: externally normal with grossly normal hearing  AD: EAC patent, TM intact, no middle ear effusion, no retractions  AS: EAC patent, TM intact, no middle ear effusion, no retractions  Nose: bilateral nares patent, midline septum, no rhinorrhea, no external deformity, no turbinate hypertrophy  OC/OP: MMM, no intraoral lesions, no trismus, dentition is moderate, no uvular deviation, bilaterally symmetric soft palate elevation, palatoglossus and palatopharyngeal fold wnl; tonsils are symmetric and 1+  Indirect laryngoscopy: deferred due to patient intolerance  Neck: soft, supple, no LAD, normal ROM, no thyromegaly  Respiratory: nonlabored, no wheezing, bilateral chest rise  Cardiovascular: RRR  Gastrointestinal: S NT ND  Skin: warm, no lesions  Musculoskeletal: 5/5 strength  Psych: Appropriate affect/mood     Hallpike negative bilaterally today in clinic.    Pertinent Data:  ? LABS:  ? AUDIO:             ? PATH:      Imaging:   I personally reviewed the following images:        Assessment/Plan:  43 y.o. female returns with persistent dizziness. She has had unremarkable EKG, echo, labs, & CT head. Recently admitted with SVT which resoled. Vestibular battery unremarkable for cause & audio WNL, Hallpike negative today- reviewed. CT head 2/2024 reviewed with pt & reassurance provided that she has no nasal or sinus abnormality nor ME fluid or infection. BPPV & vestibular etiology have been ruled out.  - Safety precautions  - Ok to continue meclizine prn  - F/u with PCP  - RTC prn      Leena Mitchell NP

## 2024-06-23 ENCOUNTER — HOSPITAL ENCOUNTER (EMERGENCY)
Facility: HOSPITAL | Age: 44
Discharge: HOME | End: 2024-06-23
Attending: EMERGENCY MEDICINE
Payer: MEDICAID

## 2024-06-23 VITALS
RESPIRATION RATE: 19 BRPM | TEMPERATURE: 98 F | BODY MASS INDEX: 45.99 KG/M2 | SYSTOLIC BLOOD PRESSURE: 116 MMHG | WEIGHT: 293 LBS | DIASTOLIC BLOOD PRESSURE: 67 MMHG | OXYGEN SATURATION: 95 % | HEART RATE: 68 BPM | HEIGHT: 67 IN

## 2024-06-23 DIAGNOSIS — R07.9 CHEST PAIN: ICD-10-CM

## 2024-06-23 DIAGNOSIS — R00.2 PALPITATIONS: Primary | ICD-10-CM

## 2024-06-23 LAB
ANION GAP SERPL CALC-SCNC: 9 MEQ/L
B-HCG UR QL: NEGATIVE
BACTERIA #/AREA URNS AUTO: ABNORMAL /HPF
BASOPHILS # BLD AUTO: 0.06 X10(3)/MCL
BASOPHILS NFR BLD AUTO: 0.7 %
BILIRUB UR QL STRIP.AUTO: NEGATIVE
BUN SERPL-MCNC: 9.2 MG/DL (ref 7–18.7)
CALCIUM SERPL-MCNC: 9.2 MG/DL (ref 8.4–10.2)
CHLORIDE SERPL-SCNC: 105 MMOL/L (ref 98–107)
CLARITY UR: ABNORMAL
CO2 SERPL-SCNC: 28 MMOL/L (ref 22–29)
COLOR UR AUTO: YELLOW
CREAT SERPL-MCNC: 0.88 MG/DL (ref 0.55–1.02)
CREAT/UREA NIT SERPL: 10
EOSINOPHIL # BLD AUTO: 0.36 X10(3)/MCL (ref 0–0.9)
EOSINOPHIL NFR BLD AUTO: 4 %
ERYTHROCYTE [DISTWIDTH] IN BLOOD BY AUTOMATED COUNT: 13.9 % (ref 11.5–17)
GFR SERPLBLD CREATININE-BSD FMLA CKD-EPI: >60 ML/MIN/1.73/M2
GLUCOSE SERPL-MCNC: 90 MG/DL (ref 74–100)
GLUCOSE UR QL STRIP: NEGATIVE
HCT VFR BLD AUTO: 40.2 % (ref 37–47)
HGB BLD-MCNC: 12.6 G/DL (ref 12–16)
HGB UR QL STRIP: NEGATIVE
IMM GRANULOCYTES # BLD AUTO: 0.02 X10(3)/MCL (ref 0–0.04)
IMM GRANULOCYTES NFR BLD AUTO: 0.2 %
KETONES UR QL STRIP: NEGATIVE
LEUKOCYTE ESTERASE UR QL STRIP: ABNORMAL
LYMPHOCYTES # BLD AUTO: 3.94 X10(3)/MCL (ref 0.6–4.6)
LYMPHOCYTES NFR BLD AUTO: 43.3 %
MCH RBC QN AUTO: 26.2 PG (ref 27–31)
MCHC RBC AUTO-ENTMCNC: 31.3 G/DL (ref 33–36)
MCV RBC AUTO: 83.6 FL (ref 80–94)
MONOCYTES # BLD AUTO: 0.47 X10(3)/MCL (ref 0.1–1.3)
MONOCYTES NFR BLD AUTO: 5.2 %
NEUTROPHILS # BLD AUTO: 4.25 X10(3)/MCL (ref 2.1–9.2)
NEUTROPHILS NFR BLD AUTO: 46.6 %
NITRITE UR QL STRIP: NEGATIVE
NRBC BLD AUTO-RTO: 0 %
PH UR STRIP: 7 [PH]
PLATELET # BLD AUTO: 398 X10(3)/MCL (ref 130–400)
PMV BLD AUTO: 9.8 FL (ref 7.4–10.4)
POTASSIUM SERPL-SCNC: 4.2 MMOL/L (ref 3.5–5.1)
PROT UR QL STRIP: NEGATIVE
RBC # BLD AUTO: 4.81 X10(6)/MCL (ref 4.2–5.4)
RBC #/AREA URNS AUTO: ABNORMAL /HPF
SODIUM SERPL-SCNC: 142 MMOL/L (ref 136–145)
SP GR UR STRIP.AUTO: 1.01 (ref 1–1.03)
SQUAMOUS #/AREA URNS AUTO: ABNORMAL /HPF
TROPONIN I SERPL-MCNC: <0.01 NG/ML (ref 0–0.04)
UROBILINOGEN UR STRIP-ACNC: 1
WBC # BLD AUTO: 9.1 X10(3)/MCL (ref 4.5–11.5)
WBC #/AREA URNS AUTO: ABNORMAL /HPF

## 2024-06-23 PROCEDURE — 85025 COMPLETE CBC W/AUTO DIFF WBC: CPT | Performed by: EMERGENCY MEDICINE

## 2024-06-23 PROCEDURE — 81025 URINE PREGNANCY TEST: CPT | Performed by: EMERGENCY MEDICINE

## 2024-06-23 PROCEDURE — 93010 ELECTROCARDIOGRAM REPORT: CPT | Mod: ,,, | Performed by: STUDENT IN AN ORGANIZED HEALTH CARE EDUCATION/TRAINING PROGRAM

## 2024-06-23 PROCEDURE — 81003 URINALYSIS AUTO W/O SCOPE: CPT | Performed by: EMERGENCY MEDICINE

## 2024-06-23 PROCEDURE — 84484 ASSAY OF TROPONIN QUANT: CPT | Performed by: EMERGENCY MEDICINE

## 2024-06-23 PROCEDURE — 99285 EMERGENCY DEPT VISIT HI MDM: CPT | Mod: 25

## 2024-06-23 PROCEDURE — 80048 BASIC METABOLIC PNL TOTAL CA: CPT | Performed by: EMERGENCY MEDICINE

## 2024-06-23 PROCEDURE — 93005 ELECTROCARDIOGRAM TRACING: CPT

## 2024-06-23 NOTE — ED PROVIDER NOTES
"Encounter Date: 2024       History     Chief Complaint   Patient presents with    Chest Pain     " Chest pain short of breath. No pain at present. Saw heart doctor one month ago in [place]     43-year-old female history of anxiety, SVT presents with palpitations described as heart skipping a beat all day.  Patient says when it happens she feels little short of breath.  She also gets some sharp intermittent chest pain.  No aggravating or alleviating factors.  No cough, diaphoresis, nausea.  No cardiac family history.  Patient does smoke.    The history is provided by the patient.     Review of patient's allergies indicates:  No Known Allergies  Past Medical History:   Diagnosis Date    Benign paroxysmal vertigo, bilateral      Past Surgical History:   Procedure Laterality Date    BILATERAL TUBAL LIGATION Bilateral      SECTION      x1     Family History   Problem Relation Name Age of Onset    Diabetes Mother      No Known Problems Father      Diabetes Sister      Diabetes Brother       Social History     Tobacco Use    Smoking status: Every Day     Average packs/day: 0.5 packs/day for 10.0 years (5.0 ttl pk-yrs)     Types: Cigarettes     Start date: 1998    Smokeless tobacco: Never   Substance Use Topics    Alcohol use: Not Currently    Drug use: Never     Review of Systems   Constitutional:  Negative for chills, diaphoresis, fatigue and fever.   HENT:  Negative for congestion, drooling, ear discharge, ear pain, postnasal drip, rhinorrhea, sinus pressure, sinus pain, sore throat and tinnitus.    Eyes:  Negative for discharge.   Respiratory:  Negative for cough, chest tightness, shortness of breath and wheezing.    Cardiovascular:  Positive for chest pain and palpitations.   Gastrointestinal:  Negative for abdominal pain, diarrhea, nausea and vomiting.   Genitourinary:  Negative for frequency, hematuria and urgency.   Musculoskeletal:  Negative for back pain, neck pain and neck stiffness.   Skin:  " Negative for rash.   Neurological:  Negative for syncope, weakness, light-headedness, numbness and headaches.   Psychiatric/Behavioral:  Negative for suicidal ideas.        Physical Exam     Initial Vitals [06/23/24 0039]   BP Pulse Resp Temp SpO2   123/60 63 20 97.7 °F (36.5 °C) 100 %      MAP       --         Physical Exam    Nursing note and vitals reviewed.  Constitutional: No distress.   Cardiovascular:  Normal rate.           Pulmonary/Chest: Breath sounds normal. She has no wheezes. She exhibits no tenderness.   Abdominal: There is no abdominal tenderness.   Musculoskeletal:         General: No tenderness. Normal range of motion.     Neurological: She is alert and oriented to person, place, and time. She has normal strength.         ED Course   Procedures  Labs Reviewed   CBC WITH DIFFERENTIAL - Abnormal; Notable for the following components:       Result Value    MCH 26.2 (*)     MCHC 31.3 (*)     All other components within normal limits   URINALYSIS, REFLEX TO URINE CULTURE - Abnormal; Notable for the following components:    Appearance, UA SL CLOUDY (*)     Leukocyte Esterase, UA Small (*)     All other components within normal limits   URINALYSIS, MICROSCOPIC - Abnormal; Notable for the following components:    Squamous Epithelial Cells, UA Few (*)     All other components within normal limits   TROPONIN I - Normal   PREGNANCY TEST, URINE RAPID - Normal   CBC W/ AUTO DIFFERENTIAL    Narrative:     The following orders were created for panel order CBC auto differential.  Procedure                               Abnormality         Status                     ---------                               -----------         ------                     CBC with Differential[[id number]]       Abnormal            Final result                 Please view results for these tests on the individual orders.   BASIC METABOLIC PANEL          Imaging Results              X-Ray Chest 1 View (In process)                       Medications - No data to display  Medical Decision Making  Medical Decision Making  Problem list/ differential diagnosis including but not limited to:  Arrhythmia, PVC/PAC, sick sinus, ACS, cardiomyopathy, CHF, mitral valve prolapse, pericarditis/myocarditis, panic attack/anxiety, alcohol/drug abuse, caffeine, medication side effect, hyperthyroid, anemia, PE, dehydration    Patient's chronic illnesses impacting care:  none    Diagnostic test considered but not ordered:    My interpretations:  EKG: Sinus bradycardia at 52.  Flipped T-wave inferiorly.  Axis normal  Chest x-ray: Poorly penetrated.  Poor inspiratory effort.    Radiology reports      Discussion of case with external qualified healthcare professionals:  Not applicable    Review of external notes( inpt, ems, NH, clinic):      Decision rules/scores:    Medications reviewed:  Medications ordered in the ER:  Discharge prescriptions:    Social variables possible impacting patient's healthcare:    Code status/discussion    Shared decision making:    Consideration for admission versus discharge: stable for discharge     Amount and/or Complexity of Data Reviewed  Labs: ordered. Decision-making details documented in ED Course.  Radiology: ordered.      Additional MDM:   PERC Rule:   Age is greater than or equal to 50 = 0.0  Heart Rate is greater than or equal to 100 = 0.0  SaO2 on room air < 95% = 0.0  Unilateral leg swelling = 0.0  Hemoptysis = 0.0  Recent surgery or trauma = 0.0  Prior PE or DVT =  0.0  Hormone use = 0.00  PERC Score = 0        Well's Criteria Score:  -Clinical symptoms of DVT (leg swelling, pain with palpation) = 0.0  -PE is #1 diagnosis OR equally likely =            0.0  -Heart Rate >100 =   0.0  -Immobilization (= or > than 3 days) or surgery in the previous 4 weeks = 0.0  -Previous DVT/PE = 0.0  -Hemoptysis =          0.0  -Malignancy =           0.0  Well's Probability Score =    0      Heart Score:    History:          Slightly  suspicious.  ECG:             Nonspecific repolarisation disturbance  Age:               Less than 45 years  Risk factors: 1-2 risk factors  Troponin:       Less than or equal to normal limit  Heart Score = 2                ED Course as of 06/23/24 0218   Sun Jun 23, 2024 0207 Troponin I: <0.010 [WC]   0208 hCG Qualitative, Urine: Negative [WC]      ED Course User Index  [WC] Antonio Vasquez MD                           Clinical Impression:  Final diagnoses:  [R07.9] Chest pain  [R00.2] Palpitations (Primary)          ED Disposition Condition    Discharge Stable          ED Prescriptions    None       Follow-up Information    None          Antonio Vasquez MD  06/23/24 0219

## 2024-06-24 LAB
OHS QRS DURATION: 92 MS
OHS QTC CALCULATION: 398 MS

## 2024-08-21 ENCOUNTER — HOSPITAL ENCOUNTER (EMERGENCY)
Facility: HOSPITAL | Age: 44
Discharge: HOME | End: 2024-08-21
Attending: EMERGENCY MEDICINE
Payer: MEDICAID

## 2024-08-21 VITALS
DIASTOLIC BLOOD PRESSURE: 74 MMHG | HEART RATE: 75 BPM | RESPIRATION RATE: 16 BRPM | WEIGHT: 293 LBS | SYSTOLIC BLOOD PRESSURE: 122 MMHG | TEMPERATURE: 98 F | HEIGHT: 67 IN | BODY MASS INDEX: 45.99 KG/M2 | OXYGEN SATURATION: 98 %

## 2024-08-21 DIAGNOSIS — F41.0 PANIC ATTACK: Primary | ICD-10-CM

## 2024-08-21 DIAGNOSIS — R00.2 PALPITATIONS: ICD-10-CM

## 2024-08-21 DIAGNOSIS — F41.9 ANXIETY: ICD-10-CM

## 2024-08-21 DIAGNOSIS — I10 HYPERTENSION, UNSPECIFIED TYPE: ICD-10-CM

## 2024-08-21 LAB
ALBUMIN SERPL-MCNC: 3.9 G/DL (ref 3.4–5)
ALBUMIN/GLOB SERPL: 1.1 RATIO
ALP SERPL-CCNC: 90 UNIT/L (ref 50–144)
ALT SERPL-CCNC: 12 UNIT/L (ref 1–45)
ANION GAP SERPL CALC-SCNC: 4 MEQ/L (ref 2–13)
AST SERPL-CCNC: 20 UNIT/L (ref 14–36)
B-HCG FREE SERPL-ACNC: <2.39 MIU/ML
BASOPHILS # BLD AUTO: 0.06 X10(3)/MCL (ref 0.01–0.08)
BASOPHILS NFR BLD AUTO: 0.7 % (ref 0.1–1.2)
BILIRUB SERPL-MCNC: 0.2 MG/DL (ref 0–1)
BNP BLD-MCNC: 36.1 PG/ML (ref 0–124.9)
BUN SERPL-MCNC: 8 MG/DL (ref 7–20)
CALCIUM SERPL-MCNC: 9.3 MG/DL (ref 8.4–10.2)
CHLORIDE SERPL-SCNC: 105 MMOL/L (ref 98–110)
CO2 SERPL-SCNC: 29 MMOL/L (ref 21–32)
CREAT SERPL-MCNC: 0.87 MG/DL (ref 0.66–1.25)
CREAT/UREA NIT SERPL: 9 (ref 12–20)
EOSINOPHIL # BLD AUTO: 0.39 X10(3)/MCL (ref 0.04–0.36)
EOSINOPHIL NFR BLD AUTO: 4.6 % (ref 0.7–7)
ERYTHROCYTE [DISTWIDTH] IN BLOOD BY AUTOMATED COUNT: 14.1 % (ref 11–14.5)
GFR SERPLBLD CREATININE-BSD FMLA CKD-EPI: 85 ML/MIN/1.73/M2
GLOBULIN SER-MCNC: 3.6 GM/DL (ref 2–3.9)
GLUCOSE SERPL-MCNC: 110 MG/DL (ref 70–115)
HCT VFR BLD AUTO: 41 % (ref 36–48)
HGB BLD-MCNC: 13.1 G/DL (ref 11.8–16)
IMM GRANULOCYTES # BLD AUTO: 0.02 X10(3)/MCL (ref 0–0.03)
IMM GRANULOCYTES NFR BLD AUTO: 0.2 % (ref 0–0.5)
LYMPHOCYTES # BLD AUTO: 4.67 X10(3)/MCL (ref 1.16–3.74)
LYMPHOCYTES NFR BLD AUTO: 54.7 % (ref 20–55)
MAGNESIUM SERPL-MCNC: 1.8 MG/DL (ref 1.8–2.4)
MCH RBC QN AUTO: 26.1 PG (ref 27–34)
MCHC RBC AUTO-ENTMCNC: 32 G/DL (ref 31–37)
MCV RBC AUTO: 81.7 FL (ref 79–99)
MONOCYTES # BLD AUTO: 0.48 X10(3)/MCL (ref 0.24–0.36)
MONOCYTES NFR BLD AUTO: 5.6 % (ref 4.7–12.5)
NEUTROPHILS # BLD AUTO: 2.92 X10(3)/MCL (ref 1.56–6.13)
NEUTROPHILS NFR BLD AUTO: 34.2 % (ref 37–73)
NRBC BLD AUTO-RTO: 0 %
PLATELET # BLD AUTO: 315 X10(3)/MCL (ref 140–371)
PMV BLD AUTO: 9.9 FL (ref 9.4–12.4)
POTASSIUM SERPL-SCNC: 3.3 MMOL/L (ref 3.5–5.1)
PROT SERPL-MCNC: 7.5 GM/DL (ref 6.3–8.2)
RBC # BLD AUTO: 5.02 X10(6)/MCL (ref 4–5.1)
SODIUM SERPL-SCNC: 138 MMOL/L (ref 136–145)
TROPONIN I SERPL-MCNC: <0.012 NG/ML (ref 0–0.03)
WBC # BLD AUTO: 8.54 X10(3)/MCL (ref 4–11.5)

## 2024-08-21 PROCEDURE — 83880 ASSAY OF NATRIURETIC PEPTIDE: CPT | Performed by: EMERGENCY MEDICINE

## 2024-08-21 PROCEDURE — 80053 COMPREHEN METABOLIC PANEL: CPT | Performed by: EMERGENCY MEDICINE

## 2024-08-21 PROCEDURE — 83735 ASSAY OF MAGNESIUM: CPT | Performed by: EMERGENCY MEDICINE

## 2024-08-21 PROCEDURE — 25000003 PHARM REV CODE 250: Performed by: EMERGENCY MEDICINE

## 2024-08-21 PROCEDURE — 99285 EMERGENCY DEPT VISIT HI MDM: CPT | Mod: 25

## 2024-08-21 PROCEDURE — 84484 ASSAY OF TROPONIN QUANT: CPT | Performed by: EMERGENCY MEDICINE

## 2024-08-21 PROCEDURE — 63600175 PHARM REV CODE 636 W HCPCS: Performed by: EMERGENCY MEDICINE

## 2024-08-21 PROCEDURE — 96374 THER/PROPH/DIAG INJ IV PUSH: CPT

## 2024-08-21 PROCEDURE — 85025 COMPLETE CBC W/AUTO DIFF WBC: CPT | Performed by: EMERGENCY MEDICINE

## 2024-08-21 PROCEDURE — 84702 CHORIONIC GONADOTROPIN TEST: CPT | Performed by: EMERGENCY MEDICINE

## 2024-08-21 RX ORDER — LORAZEPAM 2 MG/ML
2 INJECTION INTRAMUSCULAR
Status: COMPLETED | OUTPATIENT
Start: 2024-08-21 | End: 2024-08-21

## 2024-08-21 RX ORDER — POTASSIUM CHLORIDE 20 MEQ/1
40 TABLET, EXTENDED RELEASE ORAL
Status: COMPLETED | OUTPATIENT
Start: 2024-08-21 | End: 2024-08-21

## 2024-08-21 RX ADMIN — POTASSIUM CHLORIDE 40 MEQ: 1500 TABLET, EXTENDED RELEASE ORAL at 07:08

## 2024-08-21 RX ADMIN — LORAZEPAM 2 MG: 2 INJECTION, SOLUTION INTRAMUSCULAR; INTRAVENOUS at 06:08

## 2024-08-21 NOTE — ED PROVIDER NOTES
"Encounter Date: 2024       History     Chief Complaint   Patient presents with    Panic Attack     PT is extremely anxious, states she was on the way home from her daughters work this am when she became scared, tight chest, and panic feeling.      43-year-old female past medical history of hypertension, BPV, presents emergency department with palpitations and lightheadedness.  Patient says that she thought she was having some vertigo so she took 1 of her meclizine and started to experience palpitations and started to "catch a panic attack".  She says that she started to breathe fast and felt her tongue quivering and said she felt very anxious.  She says that the vertigo is gone but still feels like she is nervous and anxious.  Still feels like she is having some palpitations.  She denies any chest pain.  She does not have any shortness of breath.  Has had similar episodes in the past and it does feel similar to anxiety.      Review of patient's allergies indicates:  No Known Allergies  Past Medical History:   Diagnosis Date    Benign paroxysmal vertigo, bilateral     Hypertension      Past Surgical History:   Procedure Laterality Date    BILATERAL TUBAL LIGATION Bilateral      SECTION      x1     Family History   Problem Relation Name Age of Onset    Diabetes Mother      No Known Problems Father      Diabetes Sister      Diabetes Brother       Social History     Tobacco Use    Smoking status: Every Day     Average packs/day: 0.5 packs/day for 10.0 years (5.0 ttl pk-yrs)     Types: Cigarettes     Start date: 1998    Smokeless tobacco: Never   Substance Use Topics    Alcohol use: Not Currently    Drug use: Never     Review of Systems   Constitutional:  Negative for chills and fever.   HENT:  Negative for sore throat.    Respiratory:  Negative for shortness of breath.    Cardiovascular:  Positive for palpitations. Negative for chest pain.   Gastrointestinal:  Negative for nausea.   Genitourinary:  " Negative for dysuria.   Musculoskeletal:  Negative for back pain.   Skin:  Negative for rash.   Neurological:  Negative for weakness.   Hematological:  Does not bruise/bleed easily.   Psychiatric/Behavioral:  The patient is nervous/anxious.    All other systems reviewed and are negative.      Physical Exam     Initial Vitals [08/21/24 0623]   BP Pulse Resp Temp SpO2   (!) 204/140 78 (!) 26 98 °F (36.7 °C) 99 %      MAP       --         Physical Exam    Nursing note and vitals reviewed.  Constitutional: She appears well-developed and well-nourished. She is Obese . No distress.   HENT:   Head: Normocephalic and atraumatic.   Mouth/Throat: No oropharyngeal exudate.   Eyes: Conjunctivae and EOM are normal. Pupils are equal, round, and reactive to light.   Neck: Neck supple. No tracheal deviation present.   Cardiovascular:  Normal rate, regular rhythm, normal heart sounds and intact distal pulses.           No murmur heard.  Pulmonary/Chest: Breath sounds normal. No stridor. No respiratory distress. She has no wheezes. She has no rhonchi. She has no rales.   Abdominal: Abdomen is soft. She exhibits no distension. There is no abdominal tenderness. There is no rebound.   Musculoskeletal:         General: No tenderness or edema. Normal range of motion.      Cervical back: Neck supple.     Neurological: She is alert and oriented to person, place, and time. She has normal strength. No cranial nerve deficit or sensory deficit.   Skin: Skin is warm and dry. Capillary refill takes less than 2 seconds. No rash noted. No erythema. No pallor.   Psychiatric: Her behavior is normal. Judgment and thought content normal. Her mood appears anxious. Thought content is not paranoid and not delusional. She expresses no homicidal and no suicidal ideation.         ED Course   Procedures  Labs Reviewed   COMPREHENSIVE METABOLIC PANEL - Abnormal       Result Value    Sodium 138      Potassium 3.3 (*)     Chloride 105      CO2 29      Glucose  110      Blood Urea Nitrogen 8      Creatinine 0.87      Calcium 9.3      Protein Total 7.5      Albumin 3.9      Globulin 3.6      Albumin/Globulin Ratio 1.1      Bilirubin Total 0.2      ALP 90      ALT 12      AST 20      eGFR 85      Anion Gap 4.0      BUN/Creatinine Ratio 9 (*)    CBC WITH DIFFERENTIAL - Abnormal    WBC 8.54      RBC 5.02      Hgb 13.1      Hct 41.0      MCV 81.7      MCH 26.1 (*)     MCHC 32.0      RDW 14.1      Platelet 315      MPV 9.9      Neut % 34.2 (*)     Lymph % 54.7      Mono % 5.6      Eos % 4.6      Basophil % 0.7      Lymph # 4.67 (*)     Neut # 2.92      Mono # 0.48 (*)     Eos # 0.39 (*)     Baso # 0.06      IG# 0.02      IG% 0.2      NRBC% 0.0     NT-PRO NATRIURETIC PEPTIDE - Normal    ProBNP 36.1     TROPONIN I - Normal    Troponin-I <0.012     MAGNESIUM - Normal    Magnesium Level 1.80     CBC W/ AUTO DIFFERENTIAL    Narrative:     The following orders were created for panel order CBC auto differential.  Procedure                               Abnormality         Status                     ---------                               -----------         ------                     CBC with Differential[[id number]]       Abnormal            Final result                 Please view results for these tests on the individual orders.   HCG, QUANTITATIVE    Beta HCG Quant <2.39      Narrative:     Beta-HCG ref range weeks after implantation (mIU/mL):   3-4wks 9-130   4-5wks    5-6wks 850-53455   6-7wks 4500-155002   7-12wks [id number]   12-16wks 15100-200202   16-28wks 1400-56758   20-41wks [phone]          Imaging Results              X-Ray Chest AP Portable (Final result)  Result time 08/21/24 07:10:57      Final result by Jesus Irvin MD (08/21/24 07:10:57)                   Impression:      No acute disease is seen      Electronically signed by: Jesus Irvin MD  Date:    08/21/2024  Time:    07:10               Narrative:    EXAMINATION:  XR CHEST AP  PORTABLE    CLINICAL HISTORY:  Chest Pain;    TECHNIQUE:  Single frontal view of the chest was performed.    COMPARISON:  06/23/2024    FINDINGS:  Lungs are clear.  Heart size is within normal limits.  Costophrenic angles are clear.                        Wet Read by Smooth Castillo DO (08/21/24 06:56:05, [facility])    Unchanged when compared to prior x-ray.                                     Medications   LORazepam injection 2 mg (2 mg Intravenous Given 8/21/24 0644)   potassium chloride SA CR tablet 40 mEq (40 mEq Oral Given 8/21/24 1269)     Medical Decision Making  Differential limited to palpitations, anxiety, electrolyte abnormality, dehydration, less suspicious for PE, ACS, hypothyroidism, drug abuse, caffeine abuse, medication side effect, arrhythmia    Emergent evaluation of a 43-year-old female presents emergency department with what appears to be anxiety and panic.  Patient came in with palpitations feeling cold and having a panic attack.  She felt much better after 2 mg of IV Ativan.  Blood pressure has normalized down to 122/74.  She says she feels better in his asymptomatic.  Troponin negative, EKG nonischemic, low risk heart score doubt ACS.  Considered PE but perc score negative, low pretest probability.  Less suspicious for any drug abuse or alcohol abuse.  No history of caffeine use to excess.  No evidence of pneumonia, pneumothorax on x-ray.  Counseled patient on initial elevated blood pressure reading and she will follow up with the primary care provider for this.  She has anxiety medicine at home.  She will follow up with PCP for her anxiety.  She will return if her symptoms change or worsen.    A dictation software program was used for this note.  Please expect some simple typographical  errors in this note.    I had a detailed discussion with the patient and/or guardian regarding: The historical points, exam findings, and diagnostic results  supporting the discharge diagnosis, lab results, pertinent radiology results, and the need for outpatient follow-up, for definitive care with a family practitioner and to return to the emergency department if symptoms worsen or persist or if there are any questions or concerns that arise at home. All questions have been answered in detail. Strict return to Emergency Department precautions have been provided.       Amount and/or Complexity of Data Reviewed  External Data Reviewed: labs, ECG and notes.  Labs: ordered. Decision-making details documented in ED Course.  Radiology: ordered and independent interpretation performed. Decision-making details documented in ED Course.  ECG/medicine tests: ordered and independent interpretation performed. Decision-making details documented in ED Course.    Risk  OTC drugs.  Prescription drug management.  Decision regarding hospitalization.      Additional MDM:   PERC Rule:   Age is greater than or equal to 50 = 0.0  Heart Rate is greater than or equal to 100 = 0.0  SaO2 on room air < 95% = 0.0  Unilateral leg swelling = 0.0  Hemoptysis = 0.0  Recent surgery or trauma = 0.0  Prior PE or DVT =  0.0  Hormone use = 0.00  PERC Score = 0      Heart Score:    History:          Slightly suspicious.  ECG:             Normal  Age:               Less than 45 years  Risk factors: 1-2 risk factors  Troponin:       Less than or equal to normal limit  Heart Score = 1                ED Course as of 08/21/24 0801   Wed Aug 21, 2024   0641 EKG 6:20 a.m. normal sinus rhythm rate of 90.  No depression.  No STEMI.  Left ventricular hypertrophy.  EKG interpreted independently by me. [JR]   0954 Patient re-evaluated, she says that she is feeling better, she is not cold anymore.  Blood pressure down to 147/99 [JR]   2225 Patient reassess, repeat blood pressure 122/74.  She says her symptoms are gone she is feeling better.  Plan to discharge home. [JR]      ED Course User Index  [JR] Smooth Castillo DO                              Clinical Impression:  Final diagnoses:  [R00.2] Palpitations  [F41.9] Anxiety  [F41.0] Panic attack (Primary)  [I10] Hypertension, unspecified type          ED Disposition Condition    Discharge Stable          ED Prescriptions    None       Follow-up Information       Follow up With Specialties Details Why Contact Info    Moustapha Nieto NP Family Medicine In 3 days  [address] [place] [address]  [phone]      [facility] Emergency [facility]  If symptoms worsen [address]  [facility] [id number] [phone]             Smooth Castillo DO  08/21/24 0801

## 2024-08-21 NOTE — DISCHARGE INSTRUCTIONS
RETURN TO EMERGENCY DEPARTMENT WITHOUT FAIL, IF YOUR SYMPTOMS WORSEN, IF YOU GET NEW OR DIFFERENT SYMPTOMS, IF YOU ARE UNABLE TO FOLLOW UP AS DIRECTED, OR IF YOU HAVE ANY CONCERNS OR WORRIES.    Follow up with your primary care provider.  Your blood pressure has been elevated here in the ER.  Follow up with your primary care provider to discuss this further

## 2024-08-21 NOTE — ED NOTES
Patient stated that she was on her way back to [place], when she got cold and started shaking so she came to the er. The patient denies having any pain.

## 2025-05-07 NOTE — ED PROVIDER NOTES
"Encounter Date: 2024       History     Chief Complaint   Patient presents with    Shortness of Breath     C/o weakness, shortness of breath, and "funny feeling" in her chest this morning. Denies pain.     HPI  43-year-old female history of vertigo, anxiety, chronic episodes of palpitations and shortness of breath times 3 months now with complaints of increasing shortness of breath and palpitations x2 hours when she woke up from her nap earlier today.  Patient states that she also had a short episode of chest discomfort but only lasted a few seconds and resolved on its own.  Patient denies associated headache, fever, dizziness, neck stiffness, productive cough, abdominal pain, increasing leg edema or calf pain.  Patient has no previous history of pneumonia, PE.  Patient states that she recently was referred to a cardiologist for these symptoms which he saw 2 weeks ago and had a cardiac monitor placed which she returned to the cardiologist's office 2 days ago but does not know the results.  Patient was also told that she had an enlarged heart on an echocardiogram done by her PCP about 2 weeks ago  No ill contacts.    Review of patient's allergies indicates:  No Known Allergies  Past Medical History:   Diagnosis Date    Benign paroxysmal vertigo, bilateral      Past Surgical History:   Procedure Laterality Date    BILATERAL TUBAL LIGATION Bilateral      SECTION      x1     Family History   Problem Relation Name Age of Onset    Diabetes Mother      No Known Problems Father      Diabetes Sister      Diabetes Brother       Social History     Tobacco Use    Smoking status: Every Day     Average packs/day: 0.5 packs/day for 10.0 years (5.0 ttl pk-yrs)     Types: Cigarettes     Start date: 1998    Smokeless tobacco: Never   Substance Use Topics    Alcohol use: Not Currently    Drug use: Never     Review of Systems   All other systems reviewed and are negative.      Physical Exam     Initial Vitals [24 " 1452]   BP Pulse Resp Temp SpO2   123/74 (!) 137 (!) 22 97.4 °F (36.3 °C) 95 %      MAP       --         Physical Exam    Nursing note and vitals reviewed.  Constitutional: She appears well-developed and well-nourished.   HENT:   Head: Normocephalic and atraumatic.   Eyes: Conjunctivae and EOM are normal. Pupils are equal, round, and reactive to light.   Neck: Neck supple.   Normal range of motion.  Cardiovascular:  Regular rhythm, normal heart sounds and intact distal pulses.           Tachycardic     Pulmonary/Chest: Breath sounds normal.   Abdominal: Abdomen is soft. Bowel sounds are normal.   Musculoskeletal:         General: Normal range of motion.      Cervical back: Normal range of motion and neck supple.     Neurological: She is alert and oriented to person, place, and time.   Skin: Skin is warm and dry. Capillary refill takes less than 2 seconds.   Psychiatric: She has a normal mood and affect. Her behavior is normal. Judgment and thought content normal.         ED Course   Procedures  Labs Reviewed   COMPREHENSIVE METABOLIC PANEL - Abnormal; Notable for the following components:       Result Value    Chloride 109 (*)     Glucose 106 (*)     Albumin 3.4 (*)     Globulin 3.9 (*)     Albumin/Globulin Ratio 0.9 (*)     All other components within normal limits   URINALYSIS - Abnormal; Notable for the following components:    Blood, UA Trace-Intact (*)     All other components within normal limits   CBC WITH DIFFERENTIAL - Abnormal; Notable for the following components:    RBC 5.50 (*)     MCH 26.2 (*)     MCHC 31.9 (*)     All other components within normal limits   PROTIME-INR - Abnormal; Notable for the following components:    PT 12.4 (*)     All other components within normal limits   LIPASE - Normal   TROPONIN I - Normal   B-TYPE NATRIURETIC PEPTIDE - Normal   APTT - Normal   COVID/RSV/FLU A&B PCR - Normal    Narrative:     The Xpert Xpress SARS-CoV-2/FLU/RSV plus is a rapid, multiplexed real-time PCR  test intended for the simultaneous qualitative detection and differentiation of SARS-CoV-2, Influenza A, Influenza B, and respiratory syncytial virus (RSV) viral RNA in either nasopharyngeal swab or nasal swab specimens.         TROPONIN I - Normal   URINALYSIS, MICROSCOPIC - Normal   CBC W/ AUTO DIFFERENTIAL    Narrative:     The following orders were created for panel order CBC auto differential.  Procedure                               Abnormality         Status                     ---------                               -----------         ------                     CBC with Differential[[id number]]       Abnormal            Final result                 Please view results for these tests on the individual orders.   TROPONIN I     EKG Readings: (Independently Interpreted)   Accelerated junctional rhythm rate of 125, no acute ST elevations, nonspecific ST and T-wave changes in inferior and anterior lateral leads.  Normal axis.     ECG Results              EKG 12-lead (In process)        Collection Time Result Time QRS Duration OHS QTC Calculation    05/25/24 16:38:56 05/25/24 17:24:43 78 440                     In process by Interface, Lab In [facility] (05/25/24 17:24:52)                   Narrative:    Test Reason : R07.9,    Vent. Rate : 120 BPM     Atrial Rate : 120 BPM     P-R Int : 280 ms          QRS Dur : 078 ms      QT Int : 312 ms       P-R-T Axes : 052 -03 -51 degrees     QTc Int : 440 ms    Sinus tachycardia with 1st degree A-V block  ST and T wave abnormality, consider inferior ischemia  ST and T wave abnormality, consider anterolateral ischemia  Abnormal ECG  When compared with ECG of 25-MAY-2024 14:52,  Sinus rhythm has replaced Junctional rhythm    Referred By: [email or web address]   SELF           Confirmed By:                                   Imaging Results              CTA Chest Non-Coronary (PE Studies) (Final result)  Result time 05/25/24 16:05:50      Final result by Ozzy Maya MD (05/25/24  16:05:50)                   Impression:      1. No evidence of filling defect to suggest pulmonary embolism.  2. Ground-glass attenuation throughout both lungs with mild mosaic attenuation in the lower lobes.  Findings likely reflect edema with a small airways infectious or inflammatory process not excluded.      Electronically signed by: Ozzy Maya  Date:    05/25/2024  Time:    16:05               Narrative:    EXAMINATION:  CTA CHEST NON CORONARY (PE STUDIES)    CLINICAL HISTORY:  Pulmonary embolism (PE) suspected, neg D-dimer;    TECHNIQUE:  Low dose axial images, sagittal and coronal reformations were obtained from the thoracic inlet to the lung bases following the IV administration of 100 mL of Omnipaque 350.  Contrast timing was optimized to evaluate the pulmonary arteries.    COMPARISON:  Radiograph 05/25/2024, 02/15/2024    FINDINGS:  Pulmonary vasculature: Suboptimal opacification of the pulmonary arterial system due to contrast bolus timing.  No filling defect identified to the segmental level.    Aorta: Left-sided aortic arch.  No aneurysm and no significant atherosclerosis    Base of Neck: No significant abnormality.    Thoracic soft tissues: Normal.    Heart: Borderline enlarged.  No effusion.    [place]/Mediastinum: No pathologic ly enlargement.    Airways: Patent.    Lungs/Pleura: Ground-glass attenuation throughout both lungs with mild mosaic attenuation in the bilateral lower lobes.  No consolidation, pneumothorax or pleural effusion.    Esophagus: Normal.    Upper Abdomen: Cholelithiasis.  No gallbladder wall thickening or pericholecystic fluid to suggest acute cholecystitis.  Right renal cyst measures 2.9 cm.  Left adrenal nodule measures 1.2 cm and demonstrates attenuation compatible with an adenoma.    Bones: No acute fracture. No suspicious lytic or sclerotic lesions.                                       X-Ray Chest AP Portable (Final result)  Result time 05/25/24 15:29:48      Final  result by Ozzy Maya MD (05/25/24 15:29:48)                   Impression:      No consolidation or evidence of acute cardiac decompensation.      Electronically signed by: Ozzy Maya  Date:    05/25/2024  Time:    15:29               Narrative:    EXAMINATION:  XR CHEST AP PORTABLE    CLINICAL HISTORY:  Chest pain, unspecified    TECHNIQUE:  Single frontal view of the chest was performed.    COMPARISON:  Radiograph 02/15/2024    FINDINGS:  The cardiomediastinal silhouette is mildly enlarged.  Similar mild elevation of the right hemidiaphragm.  No consolidation, pneumothorax or pleural effusion.  No acute osseous abnormality identified.                                       Medications   furosemide injection 40 mg (has no administration in time range)   metoprolol tartrate (LOPRESSOR) tablet 25 mg (has no administration in time range)   metoprolol injection 2.5 mg (has no administration in time range)   sodium chloride 0.9% flush 10 mL (has no administration in time range)   melatonin tablet 6 mg (has no administration in time range)   LORazepam injection 1 mg (1 mg Intravenous Given 5/25/24 1551)   iopamidoL (ISOVUE-370) injection 100 mL (100 mLs Intravenous Given 5/25/24 1555)   aspirin chewable tablet 162 mg (162 mg Oral Given 5/25/24 1629)   furosemide injection 40 mg (40 mg Intravenous Given 5/25/24 1644)   metoprolol injection 2.5 mg (2.5 mg Intravenous Given 5/25/24 1644)     Medical Decision Making  Amount and/or Complexity of Data Reviewed  Labs: ordered.  Radiology: ordered.    Risk  OTC drugs.  Prescription drug management.  Decision regarding hospitalization.                     43-year-old female history of intermittent episodes of palpitations and chest discomfort with anxiety which is worsened over the past 2 hours after waking from a nap.  Patient states her chest pain only lasted for a few seconds and resolved on its own.  Patient is still complaining of some mild palpitations and  shortness of breath with any exertion.  Vital signs remarkable for an elevated heart rate of 125 on arrival, afebrile, O2 sat 100% on room air.  Respiratory rate 26.  Patient with minimal rhonchi on exam with no obvious rales or wheezing. Labs essentially WNL including a normal BNP and troponin x2.  EKG with accelerated junctional rhythm with nonspecific ST changes with no acute ST elevations.  Chest x-ray with mild cardiomegaly.  CT chest with no evidence of PE but does show evidence of probable edema.  Patient received aspirin 162 mg p.o., Ativan 1 mg IV Lasix 40 mg IV and metoprolol 2.5 mg IV.  and is feeling much better at this time.  Given that patient has new EKG changes from previous EKG as well as continued shortness of breath and evidence of CHF on CT we will consult Cardiology for further recommendations.     Tele cardiology has evaluated patient at bedside and recommends patient be admitted to this facility for serial cardiac enzymes, echocardiogram, recommend starting metoprolol 25 mg p.o. b.i.d. as well as Lasix repeat dose in a.m. of 40 mg IV.                 Clinical Impression:  Final diagnoses:  [R07.9] Chest pain  [R06.00] Dyspnea, unspecified type (Primary)  [I49.8] Accelerated junctional rhythm          ED Disposition Condition    Observation                 Jonathan Childers MD  05/26/24 0668     family member